# Patient Record
Sex: FEMALE | Race: WHITE | NOT HISPANIC OR LATINO | URBAN - METROPOLITAN AREA
[De-identification: names, ages, dates, MRNs, and addresses within clinical notes are randomized per-mention and may not be internally consistent; named-entity substitution may affect disease eponyms.]

---

## 2023-04-17 ENCOUNTER — OFFICE VISIT (OUTPATIENT)
Dept: PRIMARY CARE | Facility: CLINIC | Age: 33
End: 2023-04-17
Payer: COMMERCIAL

## 2023-04-17 VITALS
BODY MASS INDEX: 33.01 KG/M2 | TEMPERATURE: 97.9 F | SYSTOLIC BLOOD PRESSURE: 104 MMHG | DIASTOLIC BLOOD PRESSURE: 74 MMHG | HEIGHT: 62 IN | HEART RATE: 96 BPM | WEIGHT: 179.4 LBS

## 2023-04-17 DIAGNOSIS — Z00.00 HEALTH CARE MAINTENANCE: Primary | ICD-10-CM

## 2023-04-17 PROBLEM — B97.7 HPV IN FEMALE: Status: RESOLVED | Noted: 2021-06-08 | Resolved: 2023-04-17

## 2023-04-17 PROBLEM — Z98.891 S/P CESAREAN SECTION: Status: ACTIVE | Noted: 2021-12-28

## 2023-04-17 PROCEDURE — 99385 PREV VISIT NEW AGE 18-39: CPT | Performed by: NURSE PRACTITIONER

## 2023-04-17 PROCEDURE — 1036F TOBACCO NON-USER: CPT | Performed by: NURSE PRACTITIONER

## 2023-04-17 ASSESSMENT — ENCOUNTER SYMPTOMS
POLYPHAGIA: 0
BLOOD IN STOOL: 0
EYE PAIN: 0
VOMITING: 0
EYE DISCHARGE: 0
FREQUENCY: 0
ADENOPATHY: 0
COUGH: 0
MYALGIAS: 0
SHORTNESS OF BREATH: 0
WEAKNESS: 0
FEVER: 0
NUMBNESS: 0
DIFFICULTY URINATING: 0
PALPITATIONS: 0
SINUS PRESSURE: 0
UNEXPECTED WEIGHT CHANGE: 0
HEADACHES: 0
NECK PAIN: 0
ARTHRALGIAS: 0
HEMATURIA: 0
DYSURIA: 0
POLYDIPSIA: 0
EYE ITCHING: 0
FATIGUE: 0
SPEECH DIFFICULTY: 0
SORE THROAT: 0
DIARRHEA: 0
NAUSEA: 0
CONSTIPATION: 0
RHINORRHEA: 0
SLEEP DISTURBANCE: 0
DYSPHORIC MOOD: 0
BRUISES/BLEEDS EASILY: 0
PHOTOPHOBIA: 0
ABDOMINAL PAIN: 0
BACK PAIN: 0
DIZZINESS: 0
CHEST TIGHTNESS: 0
NERVOUS/ANXIOUS: 0
EYE REDNESS: 0
WHEEZING: 0
CHILLS: 0

## 2023-04-17 NOTE — PATIENT INSTRUCTIONS
Focus on a low sodium/low fat diet (whole grains, fresh fruits and vegetables, lean meats). Avoid foods high in sugar.  Increase exercise as tolerated.

## 2023-04-17 NOTE — PROGRESS NOTES
"Francisco Antonio is a 33 y.o. female who presents for Annual Exam.    HPI   Last well exam: 1 year ago  Health has been good in general.   PMH, PSH, FH and social history reviewed today.  Diet: \"pretty bad\" Boyfriend recently relapsed with leukemia  Exercise: No scheduled exercise.  Dental: Regular dental exams. Brushes1-2 times a day. Flosses 2-3 times a day.  Vision: Last eye exam: 7/2022   Corrected with glasses  Tobacco Use: No tobacco  Alcohol Use: rare alcohol use  Sexually active: Currently sexually active  PAP: UTD  Birth control: No birth control  Immunizations: UTD  Colonoscopy: No family history of colon cancer  Mammogram: No family history of breast cancer      Needs a referral to GYN.  Needs a referral to dermatology.         Review of Systems   Constitutional:  Negative for chills, fatigue, fever and unexpected weight change.   HENT:  Negative for congestion, ear pain, hearing loss, nosebleeds, postnasal drip, rhinorrhea, sinus pressure, sore throat and tinnitus.    Eyes:  Negative for photophobia, pain, discharge, redness, itching and visual disturbance.   Respiratory:  Negative for cough, chest tightness, shortness of breath and wheezing.    Cardiovascular:  Negative for chest pain, palpitations and leg swelling.   Gastrointestinal:  Negative for abdominal pain, blood in stool, constipation, diarrhea, nausea and vomiting.   Endocrine: Negative for cold intolerance, heat intolerance, polydipsia, polyphagia and polyuria.   Genitourinary:  Negative for difficulty urinating, dysuria, frequency, hematuria and urgency.   Musculoskeletal:  Negative for arthralgias, back pain, myalgias and neck pain.   Skin:  Negative for rash.   Neurological:  Negative for dizziness, syncope, speech difficulty, weakness, numbness and headaches.   Hematological:  Negative for adenopathy. Does not bruise/bleed easily.   Psychiatric/Behavioral:  Negative for dysphoric mood and sleep disturbance. The patient is not " "nervous/anxious.        Objective   /74 (BP Location: Left arm, Patient Position: Sitting)   Pulse 96   Temp 36.6 °C (97.9 °F) (Temporal)   Ht 1.575 m (5' 2\")   Wt 81.4 kg (179 lb 6.4 oz)   BMI 32.81 kg/m²     Physical Exam  Constitutional:       General: She is not in acute distress.     Appearance: Normal appearance. She is not toxic-appearing.   HENT:      Head: Normocephalic and atraumatic.      Right Ear: Tympanic membrane and ear canal normal.      Left Ear: Tympanic membrane and ear canal normal.      Nose: Nose normal.      Mouth/Throat:      Mouth: Mucous membranes are moist.      Pharynx: Oropharynx is clear.   Eyes:      Extraocular Movements: Extraocular movements intact.      Conjunctiva/sclera: Conjunctivae normal.      Pupils: Pupils are equal, round, and reactive to light.   Neck:      Thyroid: No thyroid mass or thyromegaly.   Cardiovascular:      Rate and Rhythm: Normal rate and regular rhythm.      Pulses: Normal pulses.      Heart sounds: Normal heart sounds, S1 normal and S2 normal. No murmur heard.  Pulmonary:      Effort: Pulmonary effort is normal. No respiratory distress.      Breath sounds: Normal breath sounds.   Abdominal:      General: Abdomen is flat. Bowel sounds are normal.      Palpations: Abdomen is soft.      Tenderness: There is no abdominal tenderness.   Musculoskeletal:         General: Normal range of motion.      Cervical back: Normal range of motion and neck supple.      Right lower leg: No edema.      Left lower leg: No edema.   Lymphadenopathy:      Cervical: No cervical adenopathy.   Skin:     General: Skin is warm and dry.   Neurological:      Mental Status: She is alert and oriented to person, place, and time.      Cranial Nerves: No cranial nerve deficit.      Sensory: No sensory deficit.      Motor: No weakness.      Coordination: Coordination normal.      Gait: Gait normal.      Deep Tendon Reflexes: Reflexes are normal and symmetric. Reflexes normal. "   Psychiatric:         Attention and Perception: Attention normal.         Mood and Affect: Mood and affect normal.         Speech: Speech normal.         Behavior: Behavior normal.         Thought Content: Thought content normal.         Judgment: Judgment normal.         Assessment/Plan   Problem List Items Addressed This Visit          Other    Health care maintenance - Primary     Discussed focusing on diet and exercise.  Fasting labs ordered today.         Relevant Orders    Comprehensive Metabolic Panel    CBC    Lipid Panel    Referral to Gynecology    Referral to Dermatology

## 2023-05-03 ENCOUNTER — LAB (OUTPATIENT)
Dept: LAB | Facility: LAB | Age: 33
End: 2023-05-03
Payer: COMMERCIAL

## 2023-05-03 DIAGNOSIS — Z00.00 HEALTH CARE MAINTENANCE: ICD-10-CM

## 2023-05-03 LAB
ALANINE AMINOTRANSFERASE (SGPT) (U/L) IN SER/PLAS: 33 U/L (ref 7–45)
ALBUMIN (G/DL) IN SER/PLAS: 4.3 G/DL (ref 3.4–5)
ALKALINE PHOSPHATASE (U/L) IN SER/PLAS: 110 U/L (ref 33–110)
ANION GAP IN SER/PLAS: 9 MMOL/L (ref 10–20)
ASPARTATE AMINOTRANSFERASE (SGOT) (U/L) IN SER/PLAS: 22 U/L (ref 9–39)
BILIRUBIN TOTAL (MG/DL) IN SER/PLAS: 1.1 MG/DL (ref 0–1.2)
CALCIUM (MG/DL) IN SER/PLAS: 9.5 MG/DL (ref 8.6–10.6)
CARBON DIOXIDE, TOTAL (MMOL/L) IN SER/PLAS: 28 MMOL/L (ref 21–32)
CHLORIDE (MMOL/L) IN SER/PLAS: 106 MMOL/L (ref 98–107)
CHOLESTEROL (MG/DL) IN SER/PLAS: 162 MG/DL (ref 0–199)
CHOLESTEROL IN HDL (MG/DL) IN SER/PLAS: 39 MG/DL
CHOLESTEROL/HDL RATIO: 4.2
CREATININE (MG/DL) IN SER/PLAS: 0.77 MG/DL (ref 0.5–1.05)
ERYTHROCYTE DISTRIBUTION WIDTH (RATIO) BY AUTOMATED COUNT: 13 % (ref 11.5–14.5)
ERYTHROCYTE MEAN CORPUSCULAR HEMOGLOBIN CONCENTRATION (G/DL) BY AUTOMATED: 32.2 G/DL (ref 32–36)
ERYTHROCYTE MEAN CORPUSCULAR VOLUME (FL) BY AUTOMATED COUNT: 91 FL (ref 80–100)
ERYTHROCYTES (10*6/UL) IN BLOOD BY AUTOMATED COUNT: 4.7 X10E12/L (ref 4–5.2)
GFR FEMALE: >90 ML/MIN/1.73M2
GLUCOSE (MG/DL) IN SER/PLAS: 83 MG/DL (ref 74–99)
HEMATOCRIT (%) IN BLOOD BY AUTOMATED COUNT: 42.8 % (ref 36–46)
HEMOGLOBIN (G/DL) IN BLOOD: 13.8 G/DL (ref 12–16)
LDL: 77 MG/DL (ref 0–99)
LEUKOCYTES (10*3/UL) IN BLOOD BY AUTOMATED COUNT: 5.3 X10E9/L (ref 4.4–11.3)
NON HDL CHOLESTEROL: 123 MG/DL
NRBC (PER 100 WBCS) BY AUTOMATED COUNT: 0 /100 WBC (ref 0–0)
PLATELETS (10*3/UL) IN BLOOD AUTOMATED COUNT: 229 X10E9/L (ref 150–450)
POTASSIUM (MMOL/L) IN SER/PLAS: 4.2 MMOL/L (ref 3.5–5.3)
PROTEIN TOTAL: 7.2 G/DL (ref 6.4–8.2)
SODIUM (MMOL/L) IN SER/PLAS: 139 MMOL/L (ref 136–145)
TRIGLYCERIDE (MG/DL) IN SER/PLAS: 231 MG/DL (ref 0–149)
UREA NITROGEN (MG/DL) IN SER/PLAS: 14 MG/DL (ref 6–23)
VLDL: 46 MG/DL (ref 0–40)

## 2023-05-03 PROCEDURE — 36415 COLL VENOUS BLD VENIPUNCTURE: CPT

## 2023-05-03 PROCEDURE — 85027 COMPLETE CBC AUTOMATED: CPT

## 2023-05-03 PROCEDURE — 80053 COMPREHEN METABOLIC PANEL: CPT

## 2023-05-03 PROCEDURE — 80061 LIPID PANEL: CPT

## 2023-05-09 ENCOUNTER — TELEPHONE (OUTPATIENT)
Dept: PRIMARY CARE | Facility: CLINIC | Age: 33
End: 2023-05-09
Payer: COMMERCIAL

## 2023-05-09 NOTE — TELEPHONE ENCOUNTER
----- Message from SLIME Guido sent at 5/8/2023 12:58 PM EDT -----  Regarding: FW: Pain in left breast  Contact: [phone]  Please set her up for a visit.  ----- Message -----  From: Savannah Hassan MA  Sent: 5/8/2023   7:18 AM EDT  To: SLIME Guido  Subject: FW: Pain in left breast                            ----- Message -----  From: Bertha Antonio  Sent: 5/7/2023   8:47 PM EDT  To:  [place] [id number] Clinical Support Staff  Subject: Pain in left breast                              Hello, for the past two weeks I have had on/off pain in my left breast. Nothing feels or looks out of the ordinary. I have noticed it happens if I lean on it or if my 1 year old pushes on it or something even slightly it sends like a burning sensation through my nipple. Then in the last day or so I’ve noticed some intermittent throbbing near my left armpit. I’m sure it could be a million little things but I’m wondering if i should get it checked? Thanks!

## 2023-05-09 NOTE — TELEPHONE ENCOUNTER
Per HIPAA left msg to call the office and schedule an appointment. Also sent a msg through [facility].

## 2023-05-15 ENCOUNTER — OFFICE VISIT (OUTPATIENT)
Dept: PRIMARY CARE | Facility: CLINIC | Age: 33
End: 2023-05-15
Payer: COMMERCIAL

## 2023-05-15 VITALS
TEMPERATURE: 97.7 F | WEIGHT: 178.4 LBS | DIASTOLIC BLOOD PRESSURE: 73 MMHG | BODY MASS INDEX: 32.63 KG/M2 | SYSTOLIC BLOOD PRESSURE: 114 MMHG | HEART RATE: 94 BPM

## 2023-05-15 DIAGNOSIS — R53.83 OTHER FATIGUE: ICD-10-CM

## 2023-05-15 DIAGNOSIS — N64.4 BREAST PAIN, LEFT: Primary | ICD-10-CM

## 2023-05-15 PROBLEM — R87.619 ABNORMAL PAP SMEAR OF CERVIX: Status: RESOLVED | Noted: 2019-06-11 | Resolved: 2023-05-15

## 2023-05-15 PROCEDURE — 99213 OFFICE O/P EST LOW 20 MIN: CPT | Performed by: NURSE PRACTITIONER

## 2023-05-15 PROCEDURE — 1036F TOBACCO NON-USER: CPT | Performed by: NURSE PRACTITIONER

## 2023-05-15 ASSESSMENT — ENCOUNTER SYMPTOMS
FEVER: 0
FATIGUE: 1
ABDOMINAL PAIN: 0
DIARRHEA: 0
BREAST PAIN: 1
MYALGIAS: 1
VOMITING: 0
NAUSEA: 0
CHILLS: 1
SORE THROAT: 1

## 2023-05-15 NOTE — PROGRESS NOTES
Subjective   Bertha Antonio is a 33 y.o. female who presents for Breast Pain (Left breast to armpit. ).    Breast Pain  Associated Symptoms: breast pain       She presents to the office with about 3 weeks of symptoms.  Felt tired and generally sick. Stayed home for a day. Had a sore throat at times.  (+) generalized body aches and fatigue. About 30 minutes after taking Ibuprofen broke out in a sweat but never had a fever that she is aware of.  Dean had CMV virus recently. 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   She has had normal period 5/1/23.    Then 2-2.5 weeks ago noticed with pressure she would get a burning sensation in the left breast towards her nipple. No nipple discharge.  Never occurring at rest -only with pressure.  Last week felt a throbbing pain up into the left lymph nodes/axilla region.  She has not been able to feel a lump.  No known injury.  Not currently breast feeding- last was October.  No improvement after having her period.    Lab Results   Component Value Date    GLUCOSE 83 05/03/2023    CALCIUM 9.5 05/03/2023     05/03/2023    K 4.2 05/03/2023    CO2 28 05/03/2023     05/03/2023    BUN 14 05/03/2023    CREATININE 0.77 05/03/2023      Lab Results   Component Value Date    WBC 5.3 05/03/2023    HGB 13.8 05/03/2023    HCT 42.8 05/03/2023    MCV 91 05/03/2023     05/03/2023        Review of Systems   Constitutional:  Positive for chills and fatigue. Negative for fever.   HENT:  Positive for sore throat.    Gastrointestinal:  Negative for abdominal pain, diarrhea, nausea and vomiting.   Musculoskeletal:  Positive for myalgias.     Objective   /73   Pulse 94   Temp 36.5 °C (97.7 °F) (Temporal)   Wt 80.9 kg (178 lb 6.4 oz)   BMI 32.63 kg/m²     Physical Exam  Constitutional:       General: She is not in acute distress.     Appearance: Normal appearance. She is not toxic-appearing.   Eyes:      Extraocular Movements: Extraocular movements intact.       Conjunctiva/sclera: Conjunctivae normal.      Pupils: Pupils are equal, round, and reactive to light.   Cardiovascular:      Rate and Rhythm: Normal rate and regular rhythm.      Pulses: Normal pulses.      Heart sounds: Normal heart sounds, S1 normal and S2 normal. No murmur heard.  Pulmonary:      Effort: Pulmonary effort is normal. No respiratory distress.      Breath sounds: Normal breath sounds.   Chest:   Breasts:     Right: Normal.      Left: Normal.   Abdominal:      General: Bowel sounds are normal.      Palpations: Abdomen is soft.      Tenderness: There is no abdominal tenderness.   Musculoskeletal:      Right lower leg: No edema.      Left lower leg: No edema.   Lymphadenopathy:      Cervical: No cervical adenopathy.      Upper Body:      Right upper body: No supraclavicular or axillary adenopathy.      Left upper body: No supraclavicular or axillary adenopathy.   Neurological:      Mental Status: She is alert and oriented to person, place, and time.   Psychiatric:         Attention and Perception: Attention normal.         Mood and Affect: Mood and affect normal.         Behavior: Behavior normal. Behavior is cooperative.         Thought Content: Thought content normal.         Cognition and Memory: Cognition normal.         Judgment: Judgment normal.         Assessment/Plan   Problem List Items Addressed This Visit          Other    Breast pain, left - Primary     Check diagnostic mammogram and US.         Relevant Orders    BI mammo bilateral diagnostic    BI US breast complete left    Other fatigue     If persistent, will further evaluate.   Reviewed labs done 2 weeks ago.

## 2024-02-20 ENCOUNTER — LAB (OUTPATIENT)
Dept: LAB | Facility: LAB | Age: 34
End: 2024-02-20
Payer: COMMERCIAL

## 2024-02-20 DIAGNOSIS — R53.83 OTHER FATIGUE: ICD-10-CM

## 2024-02-20 PROCEDURE — 36415 COLL VENOUS BLD VENIPUNCTURE: CPT

## 2024-02-20 PROCEDURE — 84443 ASSAY THYROID STIM HORMONE: CPT

## 2024-02-21 LAB — TSH SERPL-ACNC: 1.43 MIU/L (ref 0.44–3.98)

## 2024-03-19 PROBLEM — R07.9 CHEST PAIN: Status: ACTIVE | Noted: 2024-03-19

## 2024-03-19 PROBLEM — Z20.822 CONTACT WITH AND (SUSPECTED) EXPOSURE TO COVID-19: Status: ACTIVE | Noted: 2023-08-24

## 2024-03-19 PROBLEM — F32.A DEPRESSION: Status: ACTIVE | Noted: 2024-03-19

## 2024-03-22 ENCOUNTER — OFFICE VISIT (OUTPATIENT)
Dept: PRIMARY CARE | Facility: CLINIC | Age: 34
End: 2024-03-22
Payer: COMMERCIAL

## 2024-03-22 VITALS
BODY MASS INDEX: 32.65 KG/M2 | WEIGHT: 178.5 LBS | OXYGEN SATURATION: 99 % | DIASTOLIC BLOOD PRESSURE: 70 MMHG | SYSTOLIC BLOOD PRESSURE: 111 MMHG | HEART RATE: 78 BPM | TEMPERATURE: 97.4 F

## 2024-03-22 DIAGNOSIS — M53.3 PAIN IN THE COCCYX: Primary | ICD-10-CM

## 2024-03-22 DIAGNOSIS — F41.9 ANXIETY: ICD-10-CM

## 2024-03-22 PROCEDURE — 99213 OFFICE O/P EST LOW 20 MIN: CPT | Performed by: NURSE PRACTITIONER

## 2024-03-22 PROCEDURE — 1036F TOBACCO NON-USER: CPT | Performed by: NURSE PRACTITIONER

## 2024-03-22 ASSESSMENT — ENCOUNTER SYMPTOMS
FEVER: 0
SLEEP DISTURBANCE: 0
NERVOUS/ANXIOUS: 1
LOSS OF SENSATION IN FEET: 0
BACK PAIN: 1
CHILLS: 0
FATIGUE: 0
DYSPHORIC MOOD: 0
WEAKNESS: 0
NUMBNESS: 1
DEPRESSION: 0
OCCASIONAL FEELINGS OF UNSTEADINESS: 0

## 2024-03-22 ASSESSMENT — PATIENT HEALTH QUESTIONNAIRE - PHQ9
2. FEELING DOWN, DEPRESSED OR HOPELESS: NOT AT ALL
SUM OF ALL RESPONSES TO PHQ9 QUESTIONS 1 & 2: 0
1. LITTLE INTEREST OR PLEASURE IN DOING THINGS: NOT AT ALL

## 2024-03-22 NOTE — PROGRESS NOTES
"Subjective   Bertha Antonio is a 34 y.o. female who presents for Tailbone Pain (Been on and off for about 2 years but more noticeable since having a more sedentary job.).    HPI  She presents to the office today for evaluation of tailbone pain which has been present for about 2.5 years.  Noticed tailbone pain during pregnancy- a sharp zing through tailbone. Usually occurs after sitting for a while.  Was intermittent for a while.  Uncomfortable initially but better when moving a bit.  Now has more pain since sitting more for work.  No radiation of pain down the legs.  (+) numbness/tingling when sitting in position for a while but goes away quickly if repositions.    No low back pain, hip pain or abdominal pain.  (+) occasional pain in left hip since teenage years.  Had a .  No significant urinary issues.  No issues or pain with bowel movement.  No prior injury.    She also reports she has been struggling with anxiety.  No feeling sad, down, helpless or hopeless.  Motivation is good  No SI or HI.  (+) Excessive worry about own health  (+) Worry about worst case scenarios.  (+) feelings of impending doom- worry  No panic attacks.  Sleeping well at night- sleeps too much  Diet: \"ok\" could improve  Exercise: None  Caffeine use:1 cup of coffee a day  Alcohol use: None  Drug use: None    Review of Systems   Constitutional:  Negative for chills, fatigue and fever.   Musculoskeletal:  Positive for back pain.   Neurological:  Positive for numbness. Negative for weakness.   Psychiatric/Behavioral:  Negative for dysphoric mood, self-injury, sleep disturbance and suicidal ideas. The patient is nervous/anxious.      Objective   /70   Pulse 78   Temp 36.3 °C (97.4 °F)   Wt 81 kg (178 lb 8 oz)   SpO2 99%   BMI 32.65 kg/m²     Physical Exam  Constitutional:       General: She is not in acute distress.     Appearance: Normal appearance. She is not toxic-appearing.   Cardiovascular:      Rate and Rhythm: Normal " rate and regular rhythm.      Pulses: Normal pulses.      Heart sounds: Normal heart sounds, S1 normal and S2 normal. No murmur heard.  Pulmonary:      Effort: Pulmonary effort is normal.      Breath sounds: Normal breath sounds and air entry.   Musculoskeletal:      Lumbar back: Normal. No tenderness or bony tenderness. Normal range of motion. Negative right straight leg raise test and negative left straight leg raise test.      Right lower leg: No edema.      Left lower leg: No edema.   Lymphadenopathy:      Cervical: No cervical adenopathy.   Neurological:      Mental Status: She is alert and oriented to person, place, and time.      Sensory: Sensation is intact.      Motor: Motor function is intact.   Psychiatric:         Mood and Affect: Mood normal.         Behavior: Behavior normal.         Thought Content: Thought content normal.         Judgment: Judgment normal.         Assessment/Plan   Problem List Items Addressed This Visit    None  Visit Diagnoses       Pain in the coccyx    -  Primary    Relevant Orders    XR sacrum coccyx 2+ views    Referral to Physical Therapy    Anxiety        Relevant Orders    Referral to Psychology            It has been a pleasure seeing you today!

## 2024-04-22 ENCOUNTER — TELEMEDICINE (OUTPATIENT)
Dept: BEHAVIORAL HEALTH | Facility: CLINIC | Age: 34
End: 2024-04-22
Payer: COMMERCIAL

## 2024-04-22 DIAGNOSIS — F41.9 ANXIETY: ICD-10-CM

## 2024-04-22 PROCEDURE — 90791 PSYCH DIAGNOSTIC EVALUATION: CPT | Performed by: PSYCHOLOGIST

## 2024-04-22 NOTE — PROGRESS NOTES
Physical Therapy    Physical Therapy Lumbar Spine Evaluation    Patient Name: Bertha Antonio  MRN: [id number]  Today's Date: 2024  Time Calculation  Start Time: 1045  Stop Time: 1129  Time Calculation (min): 44 min  PT Evaluation Time Entry  PT Evaluation (Low) Time Entry: 34  PT Therapeutic Procedures Time Entry  Therapeutic Exercise Time Entry: 10                 Payor:  EMPLOYEE MEDICAL PLAN / Plan:  EMPLOYEE MEDICAL PLAN [employer] / Product Type: *No Product type* /     Reason for Referral: Coccyx pain  General Comment: Visit 1 of 30    Current Problem  Problem List Items Addressed This Visit             ICD-10-CM    Pain in the coccyx - Primary M53.3    Relevant Orders    Follow Up In Physical Therapy        Precautions  Precautions  Precautions Comment: None       Pain  Pain Assessment: 0-10  Pain Score: 2  Pain at worst: 10/10 for a very short period of time       SUBJECTIVE:   Pain location: tailbone pain   Intermittent   Some weeks it bother her and some weeks it does not  Positional   Denies radic     Onset: on/off for 2 years  Noticed late during pregnancy (pt had a )   Reports some L posterior ankle pain late in pregnancy-had emg-unremarkable. Has not had these symptoms in 2 years  Denies injury     -N/T  -B/B  Sometimes pain with Cough    Reviewed medical hx form     Imaging:  XR performed today. No results just yet     Aggravating factors:  Sitting, rising     Alleviating factors:  Once standing her symptoms will go away   No problems with walking     Prior level of function:  Previously independent with all functional activity    Functional limitations:  Sitting     Home setup:  Reviewed and no concern    Work:  RN-Full time    Patient stated goal:  Decrease pain    Prior tx:  None     Objective:    Lower Extremity ROM: (WNL unless documented below) (p=pain)    Lower Extremity Flexibility: (WNL unless documented below) (p=pain)  Flexibility  RIGHT LEFT   Quad 4 in  4 in     Hamstring      Hip flexor      Piriformis      ITB      Gastroc      Soleus        Lower Extremity Strength: (WNL unless documented below) (p=pain)   MMT 5/5 max  RIGHT LEFT   Hip Flexion 5 5   Hip Extension 4+ 4+   Hip Abduction 5 5   Hip Adduction 4+ 4+   Hip ER 4+ 4+   Hip IR  4+ 4+               Lumbar ROM:   Flexion WNL, NE    Extension Min loss, NE     RIGHT LEFT   Side Bend WNL, NE  WNL, NE      Torrie Lumbar repeated movements:   Pretest Symptoms: 0/10   RFIS Noted lumbar strain but not typical pain    GONSALO NE          Special tests: (WNL unless documented below)    RIGHT LEFT   SI compression - -   SI distraction  - -   Niurka - -     Myotomes: (WNL unless documented below)     Dermatomes: (WNL unless documented below)       Joint mobility: Pain with sacral mobs  Palpation: TTP over bilateral SIJ     Outcome Measure:  Other Measures  Oswestry Disablity Index (ETIENNE): 12%      TREATMENT:  Initial evaluation completed. Issued and reviewed HEP with pt that included:  Access Code: [email or web address]  URL: [email or web address]  Date: 04/23/2024  Prepared by: Michelle Cervantes    Exercises  - Prone Press Up  - 7 x weekly - 10 reps - every 2-3 hours frequency   - Standing Lumbar Extension  - 7 x weekly - 10 reps - every 2-3 hours frequency   - Seated Correct Posture  - 7 x weekly  - Bridge with Hip Abduction and Resistance - Ground Touches  - 1 x daily - 7 x weekly - 3 sets - 10 reps  - Clam with Resistance  - 1 x daily - 7 x weekly - 3 sets - 10 reps  - Sidelying Reverse Clamshell with Resistance  - 1 x daily - 7 x weekly - 3 sets - 10 reps  - Prone Alternating Arm and Leg Lifts  - 1 x daily - 7 x weekly - 3 sets - 10 reps    ASSESSEMENT  The pt presents with signs and symptoms consistent with the Physical Therapy diagnosis of LBP in sacral and coccyx region.   Pt demonstrates good ROM with some mild weakness in BLE. TTP over bilateral SI joints L>R. She also has pain with PA mobs to sacral region  however other SI provocation tests are negative at this time. Pain not produced with lumbar extension. Discussed importance of postural awareness. Pt will benefit from skilled physical therapy to reduce impairments in order to return to prior level of function, reduce pain, increase strength and ROM and improve overall posture.     The physical therapy prognosis is good for the patient to achieve their goals.   The pt tolerated therapy treatment today well with no adverse effects.    Personal factors/barriers to learning that impact therapy include:  None  Clinical presentation: Stable and/or uncomplicated characteristics  Level of clinical decision making is Low    PLAN  The pt will be seen 1 time(s) a week for 5-6 weeks.      The pt has been educated about the risks and benefits of physical therapy including manual therapy treatments and gives consent for treatment.     The patient will benefit from physical therapy treatment to include: therapeutic exercises, therapeutic activities, neurological re-education, manual therapy, modalities, and a home exercise program.       Goals:  Active       PT Problem       Reduce pain at worst to 3/10 with all functional and recreational activity.        Start:  04/23/24    Expected End:  07/22/24            Increase ROM/flexibility to WFL to perform daily functional activities         Start:  04/23/24    Expected End:  07/22/24            Increase by > or = 1/2 mm grade to improve postural awareness and body mechanics to perform daily tasks without increased pain/compensation          Start:  04/23/24    Expected End:  07/22/24            Pt to have decrease in Oswestry by 10% to display increased overall function.        Start:  04/23/24    Expected End:  07/22/24            Patient will demonstrate independence in home program for support of progression       Start:  04/23/24    Expected End:  07/22/24
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
## 2024-04-23 ENCOUNTER — HOSPITAL ENCOUNTER (OUTPATIENT)
Dept: RADIOLOGY | Facility: CLINIC | Age: 34
Discharge: HOME | End: 2024-04-23
Payer: COMMERCIAL

## 2024-04-23 ENCOUNTER — EVALUATION (OUTPATIENT)
Dept: PHYSICAL THERAPY | Facility: CLINIC | Age: 34
End: 2024-04-23
Payer: COMMERCIAL

## 2024-04-23 DIAGNOSIS — M53.3 PAIN IN THE COCCYX: ICD-10-CM

## 2024-04-23 DIAGNOSIS — M53.3 PAIN IN THE COCCYX: Primary | ICD-10-CM

## 2024-04-23 PROCEDURE — 97161 PT EVAL LOW COMPLEX 20 MIN: CPT | Mod: GP | Performed by: PHYSICAL THERAPIST

## 2024-04-23 PROCEDURE — 72220 X-RAY EXAM SACRUM TAILBONE: CPT

## 2024-04-23 PROCEDURE — 72220 X-RAY EXAM SACRUM TAILBONE: CPT | Performed by: RADIOLOGY

## 2024-04-23 PROCEDURE — 97110 THERAPEUTIC EXERCISES: CPT | Mod: GP | Performed by: PHYSICAL THERAPIST

## 2024-04-23 ASSESSMENT — PATIENT HEALTH QUESTIONNAIRE - PHQ9
1. LITTLE INTEREST OR PLEASURE IN DOING THINGS: NOT AT ALL
SUM OF ALL RESPONSES TO PHQ9 QUESTIONS 1 AND 2: 0
2. FEELING DOWN, DEPRESSED OR HOPELESS: NOT AT ALL

## 2024-04-23 ASSESSMENT — ENCOUNTER SYMPTOMS
OCCASIONAL FEELINGS OF UNSTEADINESS: 0
LOSS OF SENSATION IN FEET: 0
DEPRESSION: 0

## 2024-04-23 ASSESSMENT — PAIN SCALES - GENERAL: PAINLEVEL_OUTOF10: 2

## 2024-04-23 ASSESSMENT — PAIN - FUNCTIONAL ASSESSMENT: PAIN_FUNCTIONAL_ASSESSMENT: 0-10

## 2024-04-30 ENCOUNTER — TELEMEDICINE (OUTPATIENT)
Dept: BEHAVIORAL HEALTH | Facility: CLINIC | Age: 34
End: 2024-04-30
Payer: COMMERCIAL

## 2024-04-30 DIAGNOSIS — F41.9 ANXIETY: ICD-10-CM

## 2024-04-30 PROCEDURE — 90837 PSYTX W PT 60 MINUTES: CPT | Performed by: PSYCHOLOGIST
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
## 2024-05-09 ENCOUNTER — TREATMENT (OUTPATIENT)
Dept: PHYSICAL THERAPY | Facility: CLINIC | Age: 34
End: 2024-05-09
Payer: COMMERCIAL

## 2024-05-09 DIAGNOSIS — M53.3 PAIN IN THE COCCYX: Primary | ICD-10-CM

## 2024-05-09 PROCEDURE — 97110 THERAPEUTIC EXERCISES: CPT | Mod: GP,CQ

## 2024-05-09 PROCEDURE — 97140 MANUAL THERAPY 1/> REGIONS: CPT | Mod: GP,CQ

## 2024-05-09 ASSESSMENT — PAIN - FUNCTIONAL ASSESSMENT: PAIN_FUNCTIONAL_ASSESSMENT: 0-10

## 2024-05-09 ASSESSMENT — PAIN SCALES - GENERAL: PAINLEVEL_OUTOF10: 0 - NO PAIN

## 2024-05-09 NOTE — PROGRESS NOTES
Physical Therapy Treatment    Patient Name: Bertha Antonio  MRN: [id number]  Today's Date: 5/9/2024  Visit #2                          Payor:  EMPLOYEE MEDICAL PLAN / Plan:  EMPLOYEE MEDICAL PLAN CONSUMER [employer] / Product Type: *No Product type* /   Ana Robbins  Reason for Referral: Coccyx pain  General Comment: Visit 2 of 30      Current Problem:  Problem List Items Addressed This Visit             ICD-10-CM    Pain in the coccyx - Primary M53.3       Subjective   General:  Pt reports coccyx pain persists without much change. She notices tailbone pain vs low back pain.  HEP seems to help.     Pain:  Pain Assessment: 0-10  Pain Score: 0 - No pain      Precautions:  Precautions  Precautions Comment: None      Objective   No objective measures taken this visit  L PSIS TTP and elevated compared to R  Tenderness along coccyx     Treatment:  Therapeutic exercise  TM warm up x5 min 2.1 MPH   BL HSS x1 min, standing   BL piriformis stretch supine x1 min   Pelvic floor contraction x10 , supine   Supine adductor squeeze 5 sec hold 2x10 with pelvic floor contraction added to HEP   Bridge with pelvic floor contraction x10   Quadruped OL 2 sec hold x10 , DLS   Quadruped OA/OL x10 ea, DLS     Hip IR RTB 2x10, seated   Hip ER RTB 2x10 seated     Neuromuscular Re-education       Manual   PA mobs to L/S, gentle STM to L>R Piriformis and glute  Informed consent given on manual treatment. Pt given opportunity to cease treatment at any time. Educated pt on expected soreness. Pt voiced understanding  No adverse effects were noted post tx.      Modalities  Declined     Assessment   Pain in coccyx with pelvic floor contraction while in supine but did improve with less pain as reps went on.   Tenderness along coccyx, PSIS and piriformis with manual tx.   Pt overall tolerated tx well without complaints.    Plan    Continue to progress POC as tolerated by patient to improve strength, mobility and overall function      HEP:  Access  Code: [email or web address]  [facility]: [email or web address]  Date: 05/09/2024  Prepared by: Michelle Tomas    Exercises  - Prone Press Up  - 7 x weekly - 10 reps - every 2-3 hours frequency   - Standing Lumbar Extension  - 7 x weekly - 10 reps - every 2-3 hours frequency   - Seated Correct Posture  - 7 x weekly  - Bridge with Hip Abduction and Resistance - Ground Touches  - 1 x daily - 7 x weekly - 3 sets - 10 reps  - Clam with Resistance  - 1 x daily - 7 x weekly - 3 sets - 10 reps  - Sidelying Reverse Clamshell with Resistance  - 1 x daily - 7 x weekly - 3 sets - 10 reps  - Prone Alternating Arm and Leg Lifts  - 1 x daily - 7 x weekly - 3 sets - 10 reps  - Supine Hip Adduction Isometric with Ball  - 1 x daily - 7 x weekly - 2 sets - 10 reps - 5 sec hold  Goals:  Active       PT Problem       Reduce pain at worst to 3/10 with all functional and recreational activity.        Start:  04/23/24    Expected End:  07/22/24            Increase ROM/flexibility to WFL to perform daily functional activities         Start:  04/23/24    Expected End:  07/22/24            Increase by > or = 1/2 mm grade to improve postural awareness and body mechanics to perform daily tasks without increased pain/compensation          Start:  04/23/24    Expected End:  07/22/24            Pt to have decrease in Oswestry by 10% to display increased overall function.        Start:  04/23/24    Expected End:  07/22/24            Patient will demonstrate independence in home program for support of progression       Start:  04/23/24    Expected End:  07/22/24

## 2024-05-16 ENCOUNTER — TREATMENT (OUTPATIENT)
Dept: PHYSICAL THERAPY | Facility: CLINIC | Age: 34
End: 2024-05-16
Payer: COMMERCIAL

## 2024-05-16 DIAGNOSIS — M53.3 PAIN IN THE COCCYX: ICD-10-CM

## 2024-05-16 PROCEDURE — 97110 THERAPEUTIC EXERCISES: CPT | Mod: GP | Performed by: PHYSICAL THERAPIST

## 2024-05-16 PROCEDURE — 97140 MANUAL THERAPY 1/> REGIONS: CPT | Mod: GP | Performed by: PHYSICAL THERAPIST

## 2024-05-16 ASSESSMENT — PAIN SCALES - GENERAL: PAINLEVEL_OUTOF10: 4

## 2024-05-16 NOTE — PROGRESS NOTES
Physical Therapy Treatment    Patient Name: Bertha Antonio  MRN: [id number]  Today's Date: 5/16/2024  Visit #3  Time Calculation  Start Time: 1315  Stop Time: 1357  Time Calculation (min): 42 min     PT Therapeutic Procedures Time Entry  Manual Therapy Time Entry: 10  Therapeutic Exercise Time Entry: 32                 Payor:  EMPLOYEE MEDICAL PLAN / Plan:  EMPLOYEE MEDICAL PLAN CONSUMER [employer] / Product Type: *No Product type* /   Ana Robbins  Reason for Referral: Coccyx pain  General Comment: Visit 3 of 30      Current Problem:  Problem List Items Addressed This Visit             ICD-10-CM    Pain in the coccyx M53.3         Subjective   General:  Pt reports she experienced some initial back soreness after last session but then notes improved pain overall. However, today she has some soreness and is not sure if it is from playing with kids.   HEP seems to help.     Pain:  Pain Score: 4      Precautions:  Precautions  Precautions Comment: None      Objective   No objective measures taken this visit  L PSIS TTP and elevated compared to R  Tenderness along coccyx     Treatment:  Therapeutic exercise  TM warm up x5 min 2.1 MPH   BL HSS x1 min, standing   BL piriformis stretch supine x1 min   Inch worm/monster walks RTB 40 ft x 2   Pelvic floor contraction x10 , supine     Clamshell progression #4 2x10  Supine piriformis stretch after clamshells due to cramping 30 sec x 2   Quadruped OA/OL x10 ea, DLS   REIL x 10     Hip IR RTB 2x10, seated, with yellow ball   Hip ER RTB 2x10 seated       Manual   PA mobs to L/S, gentle STM to L>R Piriformis and glute  Informed consent given on manual treatment. Pt given opportunity to cease treatment at any time. Educated pt on expected soreness. Pt voiced understanding  No adverse effects were noted post tx.      Modalities  Declined     Assessment   Pt tolerated exercise progressions well. Noted some mm fatigue with clamshell progression. Pt did have some TTP over PSIS  region and in gluteal region bilaterally L>R. Noted feeling good post session. Discussed she may experience more post session soreness due to exercise progression.     Plan    Continue to progress POC as tolerated by patient to improve strength, mobility and overall function      HEP:  Access Code: [email or web address]  URL: [email or web address]  Date: 05/09/2024  Prepared by: Michelle Tomas    Exercises  - Prone Press Up  - 7 x weekly - 10 reps - every 2-3 hours frequency   - Standing Lumbar Extension  - 7 x weekly - 10 reps - every 2-3 hours frequency   - Seated Correct Posture  - 7 x weekly  - Bridge with Hip Abduction and Resistance - Ground Touches  - 1 x daily - 7 x weekly - 3 sets - 10 reps  - Clam with Resistance  - 1 x daily - 7 x weekly - 3 sets - 10 reps  - Sidelying Reverse Clamshell with Resistance  - 1 x daily - 7 x weekly - 3 sets - 10 reps  - Prone Alternating Arm and Leg Lifts  - 1 x daily - 7 x weekly - 3 sets - 10 reps  - Supine Hip Adduction Isometric with Ball  - 1 x daily - 7 x weekly - 2 sets - 10 reps - 5 sec hold  Goals:  Active       PT Problem       Reduce pain at worst to 3/10 with all functional and recreational activity.        Start:  04/23/24    Expected End:  07/22/24            Increase ROM/flexibility to WFL to perform daily functional activities         Start:  04/23/24    Expected End:  07/22/24            Increase by > or = 1/2 mm grade to improve postural awareness and body mechanics to perform daily tasks without increased pain/compensation          Start:  04/23/24    Expected End:  07/22/24            Pt to have decrease in Oswestry by 10% to display increased overall function.        Start:  04/23/24    Expected End:  07/22/24            Patient will demonstrate independence in home program for support of progression       Start:  04/23/24    Expected End:  07/22/24

## 2024-05-24 ENCOUNTER — OFFICE VISIT (OUTPATIENT)
Dept: PRIMARY CARE | Facility: CLINIC | Age: 34
End: 2024-05-24
Payer: COMMERCIAL

## 2024-05-24 VITALS
WEIGHT: 179 LBS | TEMPERATURE: 98.2 F | DIASTOLIC BLOOD PRESSURE: 84 MMHG | BODY MASS INDEX: 32.74 KG/M2 | HEART RATE: 94 BPM | OXYGEN SATURATION: 99 % | SYSTOLIC BLOOD PRESSURE: 130 MMHG

## 2024-05-24 DIAGNOSIS — H69.93 DYSFUNCTION OF BOTH EUSTACHIAN TUBES: Primary | ICD-10-CM

## 2024-05-24 ASSESSMENT — PATIENT HEALTH QUESTIONNAIRE - PHQ9
2. FEELING DOWN, DEPRESSED OR HOPELESS: NOT AT ALL
1. LITTLE INTEREST OR PLEASURE IN DOING THINGS: NOT AT ALL
SUM OF ALL RESPONSES TO PHQ9 QUESTIONS 1 AND 2: 0

## 2024-05-24 ASSESSMENT — ENCOUNTER SYMPTOMS
LOSS OF SENSATION IN FEET: 0
OCCASIONAL FEELINGS OF UNSTEADINESS: 0
DEPRESSION: 0

## 2024-05-24 NOTE — PROGRESS NOTES
Subjective   Patient ID: Bertha Antonio is a 34 y.o. female who presents for Cough, Nasal Congestion, and Earache (On and off since december).    Patient presenting due to concerns for recurrent infections.  She feels like there is a crackling and popping sensation in her ear persistently.  She does have  a toddler that gets sick intermittently her child does not go to ; however they do go around other people.     Objective   /84 (BP Location: Left arm, Patient Position: Sitting, BP Cuff Size: Adult)   Pulse 94   Temp 36.8 °C (98.2 °F) (Skin)   Wt 81.2 kg (179 lb)   SpO2 99%   BMI 32.74 kg/m²     Physical Exam  Constitutional:       Appearance: Normal appearance. She is normal weight.   HENT:      Head: Normocephalic and atraumatic.      Ears:      Comments: Serous effusion bilaterally w/ reracted TM      Nose: Nose normal.      Mouth/Throat:      Mouth: Mucous membranes are moist.      Pharynx: Oropharynx is clear.   Eyes:      Extraocular Movements: Extraocular movements intact.      Conjunctiva/sclera: Conjunctivae normal.      Pupils: Pupils are equal, round, and reactive to light.   Pulmonary:      Effort: Pulmonary effort is normal.   Neurological:      General: No focal deficit present.      Mental Status: She is alert.   Psychiatric:         Mood and Affect: Mood normal.         Assessment/Plan   Diagnoses and all orders for this visit:  Dysfunction of both eustachian tubes    Discussed Claritin and Flonase use to help prevent fluid stasis through sinuses.  Discussed common sinus infections/viral infections with young children    Blanche Justice DO     I spent a total of 13 minutes on the date of the service which included preparing to see the patient, face-to-face patient care, completing clinical documentation, performing a medically appropriate examination, counseling and educating the patient/family/caregiver and ordering medications, tests, or procedures.

## 2024-05-28 ENCOUNTER — TREATMENT (OUTPATIENT)
Dept: PHYSICAL THERAPY | Facility: CLINIC | Age: 34
End: 2024-05-28
Payer: COMMERCIAL

## 2024-05-28 DIAGNOSIS — M53.3 PAIN IN THE COCCYX: ICD-10-CM

## 2024-05-28 PROCEDURE — 97140 MANUAL THERAPY 1/> REGIONS: CPT | Mod: GP | Performed by: PHYSICAL THERAPIST

## 2024-05-28 PROCEDURE — 97110 THERAPEUTIC EXERCISES: CPT | Mod: GP | Performed by: PHYSICAL THERAPIST

## 2024-05-28 ASSESSMENT — PAIN SCALES - GENERAL: PAINLEVEL_OUTOF10: 6

## 2024-05-28 NOTE — PROGRESS NOTES
"Physical Therapy Treatment    Patient Name: Bertha Antonio  MRN: [id number]  Today's Date: 5/28/2024  Visit #4  Time Calculation  Start Time: 1535  Stop Time: 1627  Time Calculation (min): 52 min     PT Therapeutic Procedures Time Entry  Manual Therapy Time Entry: 11  Therapeutic Exercise Time Entry: 32  PT Modalities Time Entry  Hot/Cold Pack Time Entry: 10              Payor:  EMPLOYEE MEDICAL PLAN / Plan:  EMPLOYEE MEDICAL PLAN CONSUMER [employer] / Product Type: *No Product type* /   Ana Robbins  Reason for Referral: Coccyx pain  General Comment: Visit 4 of 30      Current Problem:  Problem List Items Addressed This Visit             ICD-10-CM    Pain in the coccyx M53.3           Subjective   General:  Pt her symptoms are \"a little bit better\" overall. Notes less pain in tailbone but more pain in LB.  Pain at worst this week 6/10  HEP compliance: yes     Pain:  Pain Score: 6      Precautions:  Precautions  Precautions Comment: None      Objective   No objective measures taken this visit  L PSIS TTP and elevated compared to R  Tenderness along coccyx     Treatment:  Therapeutic exercise  TM warm up x5 min 2.5 MPH   BL HSS x1 min, standing   BL piriformis stretch supine x1 min (performed seated)   Anti rotation with side step GTB x 10 ea   Bridges RSB under knees 2x10  LTR RSB x 10 ea   Inch worm/monster walks RTB 40 ft x 2-HEP    Clamshell progression #4 2x10  Quadruped OA/OL x10 ea, DLS-held   REIL x 10     SB exercises as tolerated next visit      Manual   PA mobs to L/S, gentle STM to L>R Piriformis and glute  Informed consent given on manual treatment. Pt given opportunity to cease treatment at any time. Educated pt on expected soreness. Pt voiced understanding  No adverse effects were noted post tx.      Modalities  MHP x 10 min to low back in prone    Assessment   Pt did well with exercise progressions today. Quite a bit of tenderness along R lumbar paraspinals. Tolerated MHP well     Plan    Continue " to progress POC as tolerated by patient to improve strength, mobility and overall function      HEP:  Access Code: [email or web address]  URL: [email or web address]  Date: 05/28/2024  Prepared by: Michelle Cervantes    Exercises  - Prone Press Up  - 7 x weekly - 10 reps - every 2-3 hours frequency   - Standing Lumbar Extension  - 7 x weekly - 10 reps - every 2-3 hours frequency   - Seated Correct Posture  - 7 x weekly  - Bridge with Hip Abduction and Resistance - Ground Touches  - 1 x daily - 3-4 x weekly - 3 sets - 10 reps  - Clam with Resistance  - 1 x daily - 3-4 x weekly - 3 sets - 10 reps  - Sidelying Reverse Clamshell with Resistance  - 1 x daily - 3-4 x weekly - 3 sets - 10 reps  - Prone Alternating Arm and Leg Lifts  - 1 x daily - 3-4 x weekly - 3 sets - 10 reps  - Supine Hip Adduction Isometric with Ball  - 1 x daily - 3-4 x weekly - 2 sets - 10 reps - 5 sec hold  - Forward Monster Walks  - 1 x daily - 3-4 x weekly - 3 sets - 10 reps  - Backward Monster Walks  - 1 x daily - 3-4 x weekly - 3 sets - 10 reps  - Side Stepping with Resistance at Ankles  - 1 x daily - 3-4 x weekly - 3 sets - 10 reps    Goals:  Active       PT Problem       Reduce pain at worst to 3/10 with all functional and recreational activity.        Start:  04/23/24    Expected End:  07/22/24            Increase ROM/flexibility to WFL to perform daily functional activities         Start:  04/23/24    Expected End:  07/22/24            Increase by > or = 1/2 mm grade to improve postural awareness and body mechanics to perform daily tasks without increased pain/compensation          Start:  04/23/24    Expected End:  07/22/24            Pt to have decrease in Oswestry by 10% to display increased overall function.        Start:  04/23/24    Expected End:  07/22/24            Patient will demonstrate independence in home program for support of progression       Start:  04/23/24    Expected End:  07/22/24

## 2024-06-04 ENCOUNTER — APPOINTMENT (OUTPATIENT)
Dept: PHYSICAL THERAPY | Facility: CLINIC | Age: 34
End: 2024-06-04
Payer: COMMERCIAL

## 2024-06-04 NOTE — PROGRESS NOTES
"Physical Therapy Treatment    Patient Name: Bertha Antonio  MRN: [id number]  Today's Date: 6/4/2024  Visit #5                          Payor:  EMPLOYEE MEDICAL PLAN / Plan:  EMPLOYEE MEDICAL PLAN CONSUMER [employer] / Product Type: *No Product type* /   Ana Robbins         Current Problem:  Problem List Items Addressed This Visit    None            Subjective   General:  Pt her symptoms are \"a little bit better\" overall. Notes less pain in tailbone but more pain in LB.  Pain at worst this week 6/10  HEP compliance: yes     Pain:         Precautions:         Objective   No objective measures taken this visit  L PSIS TTP and elevated compared to R  Tenderness along coccyx     Treatment:  Therapeutic exercise  TM warm up x5 min 2.5 MPH   BL HSS x1 min, standing   BL piriformis stretch supine x1 min (performed seated)   Anti rotation with side step GTB x 10 ea   Bridges RSB under knees 2x10  LTR RSB x 10 ea   Inch worm/monster walks RTB 40 ft x 2-HEP    Clamshell progression #4 2x10  Quadruped OA/OL x10 ea, DLS-held   REIL x 10     SB exercises as tolerated next visit      Manual   PA mobs to L/S, gentle STM to L>R Piriformis and glute  Informed consent given on manual treatment. Pt given opportunity to cease treatment at any time. Educated pt on expected soreness. Pt voiced understanding  No adverse effects were noted post tx.      Modalities  MHP x 10 min to low back in prone    Assessment   Pt did well with exercise progressions today. Quite a bit of tenderness along R lumbar paraspinals. Tolerated MHP well     Plan    Continue to progress POC as tolerated by patient to improve strength, mobility and overall function      HEP:  Access Code: [email or web address]  URL: [email or web address]  Date: 05/28/2024  Prepared by: Michelle Cervantes    Exercises  - Prone Press Up  - 7 x weekly - 10 reps - every 2-3 hours frequency   - Standing Lumbar Extension  - 7 x weekly - 10 reps - every 2-3 hours frequency   - " Seated Correct Posture  - 7 x weekly  - Bridge with Hip Abduction and Resistance - Ground Touches  - 1 x daily - 3-4 x weekly - 3 sets - 10 reps  - Clam with Resistance  - 1 x daily - 3-4 x weekly - 3 sets - 10 reps  - Sidelying Reverse Clamshell with Resistance  - 1 x daily - 3-4 x weekly - 3 sets - 10 reps  - Prone Alternating Arm and Leg Lifts  - 1 x daily - 3-4 x weekly - 3 sets - 10 reps  - Supine Hip Adduction Isometric with Ball  - 1 x daily - 3-4 x weekly - 2 sets - 10 reps - 5 sec hold  - Forward Monster Walks  - 1 x daily - 3-4 x weekly - 3 sets - 10 reps  - Backward Monster Walks  - 1 x daily - 3-4 x weekly - 3 sets - 10 reps  - Side Stepping with Resistance at Ankles  - 1 x daily - 3-4 x weekly - 3 sets - 10 reps    Goals:  Active       PT Problem       Reduce pain at worst to 3/10 with all functional and recreational activity.        Start:  04/23/24    Expected End:  07/22/24            Increase ROM/flexibility to WFL to perform daily functional activities         Start:  04/23/24    Expected End:  07/22/24            Increase by > or = 1/2 mm grade to improve postural awareness and body mechanics to perform daily tasks without increased pain/compensation          Start:  04/23/24    Expected End:  07/22/24            Pt to have decrease in Oswestry by 10% to display increased overall function.        Start:  04/23/24    Expected End:  07/22/24            Patient will demonstrate independence in home program for support of progression       Start:  04/23/24    Expected End:  07/22/24

## 2024-06-12 ENCOUNTER — APPOINTMENT (OUTPATIENT)
Dept: PRIMARY CARE | Facility: CLINIC | Age: 34
End: 2024-06-12
Payer: COMMERCIAL

## 2024-06-12 VITALS
DIASTOLIC BLOOD PRESSURE: 72 MMHG | TEMPERATURE: 98.3 F | HEART RATE: 95 BPM | WEIGHT: 180.4 LBS | SYSTOLIC BLOOD PRESSURE: 110 MMHG | BODY MASS INDEX: 33.2 KG/M2 | HEIGHT: 62 IN | OXYGEN SATURATION: 98 %

## 2024-06-12 DIAGNOSIS — J06.9 RECURRENT URI (UPPER RESPIRATORY INFECTION): ICD-10-CM

## 2024-06-12 DIAGNOSIS — R53.83 FATIGUE, UNSPECIFIED TYPE: Primary | ICD-10-CM

## 2024-06-12 PROCEDURE — 99213 OFFICE O/P EST LOW 20 MIN: CPT | Performed by: NURSE PRACTITIONER

## 2024-06-12 PROCEDURE — 1036F TOBACCO NON-USER: CPT | Performed by: NURSE PRACTITIONER

## 2024-06-12 RX ORDER — MULTIVITAMIN/IRON/FOLIC ACID 18MG-0.4MG
1 TABLET ORAL DAILY
COMMUNITY

## 2024-06-12 ASSESSMENT — ENCOUNTER SYMPTOMS
CHILLS: 0
WHEEZING: 0
VOICE CHANGE: 0
FATIGUE: 1
SHORTNESS OF BREATH: 0
COUGH: 1
SINUS PRESSURE: 1
DIARRHEA: 0
SINUS PAIN: 0
VOMITING: 0
RHINORRHEA: 0
NAUSEA: 0
ABDOMINAL PAIN: 0
FEVER: 1
MYALGIAS: 1
SORE THROAT: 1

## 2024-06-12 ASSESSMENT — PATIENT HEALTH QUESTIONNAIRE - PHQ9
SUM OF ALL RESPONSES TO PHQ9 QUESTIONS 1 AND 2: 0
2. FEELING DOWN, DEPRESSED OR HOPELESS: NOT AT ALL
1. LITTLE INTEREST OR PLEASURE IN DOING THINGS: NOT AT ALL

## 2024-06-12 NOTE — PATIENT INSTRUCTIONS
Continue to use Flonase.   Continue to track the frequency of viral infections.  Obtain the blood work as ordered today.

## 2024-06-12 NOTE — PROGRESS NOTES
"Subjective    Bertha Antonio is a 34 y.o. female who presents for URI (Started Sunday with ST, then developed a fever on Monday and now currently have somewhat of a cough and very congested. Pt took IBU and Dayquil and last taken yesterday.).    HPI  She presents to the office today for evaluation of symptoms which started on Sunday. Started  with a sore throat, then had body aches, low grade fever for a day. Now has nasal congestion/sinus pressure.  (+) fullness and popping of both ears.   No ear pain.  (+)PND  No significant cough.  No SOB or wheezing.  (+) fatigue  No abdominal pain, nausea, vomiting or diarrhea.    This is generally occurring about once a month. Reports recurrent URIs. They generally follow with the same course.  Symptoms resolve completely in between each occurrence other than ear fullness and popping.  She reports this has been occurring monthly since about January.  Even in between episodes feels more tired than usual.     Has a young daughter. Works in oncology.    Review of Systems   Constitutional:  Positive for fatigue and fever. Negative for chills.   HENT:  Positive for congestion, postnasal drip, sinus pressure and sore throat. Negative for ear pain, rhinorrhea, sinus pain and voice change.    Respiratory:  Positive for cough. Negative for shortness of breath and wheezing.    Gastrointestinal:  Negative for abdominal pain, diarrhea, nausea and vomiting.   Musculoskeletal:  Positive for myalgias.     Objective   /72 (BP Location: Left arm, Patient Position: Sitting)   Pulse 95   Temp 36.8 °C (98.3 °F) (Oral)   Ht 1.57 m (5' 1.81\")   Wt 81.8 kg (180 lb 6.4 oz)   SpO2 98%   BMI 33.20 kg/m²     Physical Exam  Constitutional:       General: She is not in acute distress.     Appearance: Normal appearance. She is not toxic-appearing.   HENT:      Right Ear: Tympanic membrane, ear canal and external ear normal.      Left Ear: Tympanic membrane, ear canal and external ear normal. "      Nose: Nose normal.      Mouth/Throat:      Mouth: Mucous membranes are moist.      Pharynx: Oropharynx is clear. No oropharyngeal exudate or posterior oropharyngeal erythema.   Cardiovascular:      Rate and Rhythm: Normal rate and regular rhythm.      Heart sounds: Normal heart sounds, S1 normal and S2 normal. No murmur heard.  Pulmonary:      Effort: Pulmonary effort is normal.      Breath sounds: Normal breath sounds and air entry. No wheezing.   Lymphadenopathy:      Cervical: No cervical adenopathy.   Neurological:      Mental Status: She is alert and oriented to person, place, and time.   Psychiatric:         Attention and Perception: Attention normal.         Mood and Affect: Mood and affect normal.         Behavior: Behavior is cooperative.         Thought Content: Thought content normal.         Judgment: Judgment normal.         Assessment/Plan   Problem List Items Addressed This Visit    None  Visit Diagnoses       Fatigue, unspecified type    -  Primary    Relevant Orders    Comprehensive Metabolic Panel    CBC and Auto Differential    TSH with reflex to Free T4 if abnormal    Recurrent URI (upper respiratory infection)            Symptomatic treatment. Advised to use Flonase daily to help with the postnasal drainage and eustachian tube dysfunction.  Will also check labs.     It has been a pleasure seeing you today!

## 2024-06-20 DIAGNOSIS — J01.90 ACUTE NON-RECURRENT SINUSITIS, UNSPECIFIED LOCATION: Primary | ICD-10-CM

## 2024-06-20 RX ORDER — AMOXICILLIN AND CLAVULANATE POTASSIUM 875; 125 MG/1; MG/1
875 TABLET, FILM COATED ORAL 2 TIMES DAILY
Qty: 14 TABLET | Refills: 0 | Status: SHIPPED | OUTPATIENT
Start: 2024-06-20

## 2024-07-18 PROCEDURE — 88305 TISSUE EXAM BY PATHOLOGIST: CPT | Performed by: DERMATOLOGY

## 2024-07-22 ENCOUNTER — LAB REQUISITION (OUTPATIENT)
Dept: DERMATOPATHOLOGY | Facility: CLINIC | Age: 34
End: 2024-07-22
Payer: COMMERCIAL

## 2024-07-22 DIAGNOSIS — D48.5 NEOPLASM OF UNCERTAIN BEHAVIOR OF SKIN: ICD-10-CM

## 2024-07-23 LAB
LABORATORY COMMENT REPORT: NORMAL
PATH REPORT.FINAL DX SPEC: NORMAL
PATH REPORT.GROSS SPEC: NORMAL
PATH REPORT.MICROSCOPIC SPEC OTHER STN: NORMAL
PATH REPORT.RELEVANT HX SPEC: NORMAL
PATH REPORT.TOTAL CANCER: NORMAL

## 2024-08-06 ENCOUNTER — APPOINTMENT (OUTPATIENT)
Dept: PRIMARY CARE | Facility: CLINIC | Age: 34
End: 2024-08-06
Payer: COMMERCIAL

## 2024-08-06 ENCOUNTER — LAB (OUTPATIENT)
Dept: LAB | Facility: LAB | Age: 34
End: 2024-08-06
Payer: COMMERCIAL

## 2024-08-06 VITALS
TEMPERATURE: 97.8 F | OXYGEN SATURATION: 96 % | DIASTOLIC BLOOD PRESSURE: 80 MMHG | WEIGHT: 178.6 LBS | SYSTOLIC BLOOD PRESSURE: 110 MMHG | BODY MASS INDEX: 32.87 KG/M2 | HEART RATE: 90 BPM

## 2024-08-06 DIAGNOSIS — R53.83 FATIGUE, UNSPECIFIED TYPE: ICD-10-CM

## 2024-08-06 DIAGNOSIS — H69.91 DYSFUNCTION OF RIGHT EUSTACHIAN TUBE: ICD-10-CM

## 2024-08-06 DIAGNOSIS — Z00.00 HEALTH CARE MAINTENANCE: ICD-10-CM

## 2024-08-06 DIAGNOSIS — Z00.00 HEALTH CARE MAINTENANCE: Primary | ICD-10-CM

## 2024-08-06 LAB
ALBUMIN SERPL BCP-MCNC: 4.4 G/DL (ref 3.4–5)
ALP SERPL-CCNC: 67 U/L (ref 33–110)
ALT SERPL W P-5'-P-CCNC: 17 U/L (ref 7–45)
ANION GAP SERPL CALC-SCNC: 9 MMOL/L (ref 10–20)
AST SERPL W P-5'-P-CCNC: 13 U/L (ref 9–39)
BASOPHILS # BLD AUTO: 0.03 X10*3/UL (ref 0–0.1)
BASOPHILS NFR BLD AUTO: 0.5 %
BILIRUB SERPL-MCNC: 1.2 MG/DL (ref 0–1.2)
BUN SERPL-MCNC: 11 MG/DL (ref 6–23)
CALCIUM SERPL-MCNC: 9.3 MG/DL (ref 8.6–10.3)
CHLORIDE SERPL-SCNC: 107 MMOL/L (ref 98–107)
CHOLEST SERPL-MCNC: 165 MG/DL (ref 0–199)
CHOLESTEROL/HDL RATIO: 3.2
CO2 SERPL-SCNC: 26 MMOL/L (ref 21–32)
CREAT SERPL-MCNC: 0.7 MG/DL (ref 0.5–1.05)
EGFRCR SERPLBLD CKD-EPI 2021: >90 ML/MIN/1.73M*2
EOSINOPHIL # BLD AUTO: 0.06 X10*3/UL (ref 0–0.7)
EOSINOPHIL NFR BLD AUTO: 1 %
ERYTHROCYTE [DISTWIDTH] IN BLOOD BY AUTOMATED COUNT: 12.5 % (ref 11.5–14.5)
GLUCOSE SERPL-MCNC: 76 MG/DL (ref 74–99)
HCT VFR BLD AUTO: 42.3 % (ref 36–46)
HDLC SERPL-MCNC: 51.4 MG/DL
HGB BLD-MCNC: 14.3 G/DL (ref 12–16)
IMM GRANULOCYTES # BLD AUTO: 0.01 X10*3/UL (ref 0–0.7)
IMM GRANULOCYTES NFR BLD AUTO: 0.2 % (ref 0–0.9)
LDLC SERPL CALC-MCNC: 97 MG/DL
LYMPHOCYTES # BLD AUTO: 2.44 X10*3/UL (ref 1.2–4.8)
LYMPHOCYTES NFR BLD AUTO: 40.3 %
MCH RBC QN AUTO: 30.7 PG (ref 26–34)
MCHC RBC AUTO-ENTMCNC: 33.8 G/DL (ref 32–36)
MCV RBC AUTO: 91 FL (ref 80–100)
MONOCYTES # BLD AUTO: 0.66 X10*3/UL (ref 0.1–1)
MONOCYTES NFR BLD AUTO: 10.9 %
NEUTROPHILS # BLD AUTO: 2.85 X10*3/UL (ref 1.2–7.7)
NEUTROPHILS NFR BLD AUTO: 47.1 %
NON HDL CHOLESTEROL: 114 MG/DL (ref 0–149)
NRBC BLD-RTO: 0 /100 WBCS (ref 0–0)
PLATELET # BLD AUTO: 182 X10*3/UL (ref 150–450)
POTASSIUM SERPL-SCNC: 4 MMOL/L (ref 3.5–5.3)
PROT SERPL-MCNC: 7.1 G/DL (ref 6.4–8.2)
RBC # BLD AUTO: 4.66 X10*6/UL (ref 4–5.2)
SODIUM SERPL-SCNC: 138 MMOL/L (ref 136–145)
TRIGL SERPL-MCNC: 81 MG/DL (ref 0–149)
TSH SERPL-ACNC: 1.69 MIU/L (ref 0.44–3.98)
VLDL: 16 MG/DL (ref 0–40)
WBC # BLD AUTO: 6.1 X10*3/UL (ref 4.4–11.3)

## 2024-08-06 PROCEDURE — 80061 LIPID PANEL: CPT

## 2024-08-06 PROCEDURE — 1036F TOBACCO NON-USER: CPT | Performed by: NURSE PRACTITIONER

## 2024-08-06 PROCEDURE — 36415 COLL VENOUS BLD VENIPUNCTURE: CPT

## 2024-08-06 PROCEDURE — 85025 COMPLETE CBC W/AUTO DIFF WBC: CPT

## 2024-08-06 PROCEDURE — 99395 PREV VISIT EST AGE 18-39: CPT | Performed by: NURSE PRACTITIONER

## 2024-08-06 PROCEDURE — 80053 COMPREHEN METABOLIC PANEL: CPT

## 2024-08-06 PROCEDURE — 84443 ASSAY THYROID STIM HORMONE: CPT

## 2024-08-06 ASSESSMENT — ENCOUNTER SYMPTOMS
DYSPHORIC MOOD: 0
FEVER: 0
MYALGIAS: 0
POLYDIPSIA: 0
DIFFICULTY URINATING: 0
BLOOD IN STOOL: 0
BRUISES/BLEEDS EASILY: 0
POLYPHAGIA: 0
VOMITING: 0
FATIGUE: 0
SPEECH DIFFICULTY: 0
DYSURIA: 0
ADENOPATHY: 0
HEADACHES: 0
CONSTIPATION: 0
EYE PAIN: 0
DIZZINESS: 0
ABDOMINAL PAIN: 0
NERVOUS/ANXIOUS: 1
RHINORRHEA: 0
HEMATURIA: 0
NAUSEA: 0
FREQUENCY: 0
NUMBNESS: 0
CHILLS: 0
PALPITATIONS: 0
PHOTOPHOBIA: 0
DIARRHEA: 0
BACK PAIN: 0
SORE THROAT: 0
NECK PAIN: 0
CHEST TIGHTNESS: 0
WEAKNESS: 0
COUGH: 0
SLEEP DISTURBANCE: 0
SHORTNESS OF BREATH: 0
EYE DISCHARGE: 0
WHEEZING: 0
SINUS PRESSURE: 0
ARTHRALGIAS: 0
UNEXPECTED WEIGHT CHANGE: 0
EYE ITCHING: 0
EYE REDNESS: 0

## 2024-08-06 NOTE — PROGRESS NOTES
Francisco Antonio is a 34 y.o. female who presents for Annual Exam (GYN: follows with Nikky Beltre /LMP: 24).    HPI  Last well exam: 2023  Health has been good in general other than ongoing crackling and popping in the right ear. This is intermittent. Overall has been feeling really well otherwise. Has been using Flonase and Zyrtec. With the crackling notices a tingling sensation in temple and up into the hairline.  There have been no changes in the patients PMH, PSH, FH or social history. Reviewed today.  Diet: Working on diet- generally does not eat enough fruits and vegetables  Exercise: no scheduled exercise.   Dental: Regular dental exams. Brushes 1-2 times a day. Flosses 3 times a day.  Vision: Last eye exam:  2024  Corrected with part time glasses (driving)  Tobacco Use: None  Alcohol Use: rare alcohol   Sexually active: Engaged and sexually active.  LMP: 3 weeks ago 2024  Periods are regular and occurring about every 27-30 days  Duration is 4 days.  No heavy bleeding or cramping.  (fullterm)  PAP: UTD on PAP- follows with GYN- Nancy Beltre  Birth control: None  Immunizations: UTD  Colonoscopy: No family history of colon cancer  Mammogram: No immediate family history of breast cancer.      Last labs: pending     Review of Systems   Constitutional:  Negative for chills, fatigue, fever and unexpected weight change.   HENT:  Negative for congestion, ear discharge, ear pain, hearing loss, nosebleeds, postnasal drip, rhinorrhea, sinus pressure, sore throat and tinnitus.    Eyes:  Negative for photophobia, pain, discharge, redness, itching and visual disturbance.   Respiratory:  Negative for cough, chest tightness, shortness of breath and wheezing.    Cardiovascular:  Negative for chest pain, palpitations and leg swelling.   Gastrointestinal:  Negative for abdominal pain, blood in stool, constipation, diarrhea, nausea and vomiting.   Endocrine: Negative for cold intolerance, heat  intolerance, polydipsia, polyphagia and polyuria.   Genitourinary:  Negative for difficulty urinating, dysuria, frequency, hematuria and urgency.   Musculoskeletal:  Negative for arthralgias, back pain, myalgias and neck pain.   Skin:  Negative for rash.   Neurological:  Negative for dizziness, syncope, speech difficulty, weakness, numbness and headaches.   Hematological:  Negative for adenopathy. Does not bruise/bleed easily.   Psychiatric/Behavioral:  Negative for dysphoric mood and sleep disturbance. The patient is nervous/anxious.        Objective   /80 (BP Location: Left arm, Patient Position: Sitting)   Pulse 90   Temp 36.6 °C (97.8 °F) (Temporal)   Wt 81 kg (178 lb 9.6 oz)   SpO2 96%   BMI 32.87 kg/m²     Physical Exam  Constitutional:       General: She is not in acute distress.     Appearance: Normal appearance. She is not toxic-appearing.   HENT:      Head: Normocephalic and atraumatic.      Right Ear: Tympanic membrane and ear canal normal.      Left Ear: Tympanic membrane and ear canal normal.      Nose: Nose normal.      Mouth/Throat:      Mouth: Mucous membranes are moist.      Pharynx: Oropharynx is clear.   Eyes:      Extraocular Movements: Extraocular movements intact.      Conjunctiva/sclera: Conjunctivae normal.      Pupils: Pupils are equal, round, and reactive to light.   Neck:      Thyroid: No thyroid mass or thyromegaly.      Vascular: No carotid bruit.   Cardiovascular:      Rate and Rhythm: Normal rate and regular rhythm.      Pulses: Normal pulses.      Heart sounds: Normal heart sounds, S1 normal and S2 normal. No murmur heard.  Pulmonary:      Effort: Pulmonary effort is normal. No respiratory distress.      Breath sounds: Normal breath sounds.   Abdominal:      General: Bowel sounds are normal.      Palpations: Abdomen is soft.      Tenderness: There is no abdominal tenderness.   Musculoskeletal:         General: Normal range of motion.      Cervical back: Normal range of  motion and neck supple.      Right lower leg: No edema.      Left lower leg: No edema.   Lymphadenopathy:      Cervical: No cervical adenopathy.   Skin:     General: Skin is warm and dry.   Neurological:      Mental Status: She is alert and oriented to person, place, and time.      Cranial Nerves: No cranial nerve deficit.      Sensory: No sensory deficit.      Motor: No weakness.      Coordination: Coordination normal.      Gait: Gait normal.      Deep Tendon Reflexes: Reflexes are normal and symmetric. Reflexes normal.   Psychiatric:         Attention and Perception: Attention normal.         Mood and Affect: Mood and affect normal.         Speech: Speech normal.         Behavior: Behavior normal.         Thought Content: Thought content normal.         Judgment: Judgment normal.         Assessment/Plan   Problem List Items Addressed This Visit       Health care maintenance - Primary    Relevant Orders    Lipid Panel     Other Visit Diagnoses       Dysfunction of right eustachian tube        Relevant Orders    Referral to ENT            It has been a pleasure seeing you today!

## 2024-08-20 ENCOUNTER — APPOINTMENT (OUTPATIENT)
Dept: OBSTETRICS AND GYNECOLOGY | Facility: CLINIC | Age: 34
End: 2024-08-20
Payer: COMMERCIAL

## 2024-09-13 ENCOUNTER — LAB (OUTPATIENT)
Dept: LAB | Facility: LAB | Age: 34
End: 2024-09-13
Payer: COMMERCIAL

## 2024-09-13 LAB — COTININE UR QL SCN: NEGATIVE

## 2024-10-15 ENCOUNTER — APPOINTMENT (OUTPATIENT)
Dept: OBSTETRICS AND GYNECOLOGY | Facility: CLINIC | Age: 34
End: 2024-10-15
Payer: COMMERCIAL

## 2024-10-21 ENCOUNTER — OFFICE VISIT (OUTPATIENT)
Dept: URGENT CARE | Age: 34
End: 2024-10-21
Payer: COMMERCIAL

## 2024-10-21 VITALS
SYSTOLIC BLOOD PRESSURE: 109 MMHG | HEART RATE: 94 BPM | DIASTOLIC BLOOD PRESSURE: 65 MMHG | RESPIRATION RATE: 16 BRPM | TEMPERATURE: 97.5 F | OXYGEN SATURATION: 97 %

## 2024-10-21 DIAGNOSIS — J40 BRONCHITIS: ICD-10-CM

## 2024-10-21 DIAGNOSIS — R05.1 ACUTE COUGH: Primary | ICD-10-CM

## 2024-10-21 DIAGNOSIS — R50.9 FEVER, UNSPECIFIED FEVER CAUSE: ICD-10-CM

## 2024-10-21 DIAGNOSIS — R06.2 WHEEZE: ICD-10-CM

## 2024-10-21 PROCEDURE — 1036F TOBACCO NON-USER: CPT | Performed by: NURSE PRACTITIONER

## 2024-10-21 PROCEDURE — 99203 OFFICE O/P NEW LOW 30 MIN: CPT | Performed by: NURSE PRACTITIONER

## 2024-10-21 RX ORDER — DOXYCYCLINE 100 MG/1
100 CAPSULE ORAL 2 TIMES DAILY
Qty: 20 CAPSULE | Refills: 0 | Status: SHIPPED | OUTPATIENT
Start: 2024-10-21 | End: 2024-10-31

## 2024-10-21 RX ORDER — PREDNISONE 20 MG/1
20 TABLET ORAL DAILY
Qty: 10 TABLET | Refills: 0 | Status: SHIPPED | OUTPATIENT
Start: 2024-10-21 | End: 2024-10-31

## 2024-10-21 RX ORDER — ALBUTEROL SULFATE 90 UG/1
2 INHALANT RESPIRATORY (INHALATION) EVERY 4 HOURS PRN
Qty: 8.5 G | Refills: 0 | Status: SHIPPED | OUTPATIENT
Start: 2024-10-21 | End: 2025-10-21

## 2024-10-21 ASSESSMENT — ENCOUNTER SYMPTOMS
PSYCHIATRIC NEGATIVE: 1
RHINORRHEA: 1
CHILLS: 1
SORE THROAT: 1
HEMATOLOGIC/LYMPHATIC NEGATIVE: 1
COUGH: 1
ENDOCRINE NEGATIVE: 1
HEADACHES: 1
MUSCULOSKELETAL NEGATIVE: 1
ALLERGIC/IMMUNOLOGIC NEGATIVE: 1
SWEATS: 1
FEVER: 1

## 2024-10-21 NOTE — PROGRESS NOTES
Subjective   Patient ID: Bertha Antonio is a 34 y.o. female. They present today with a chief complaint of Cough (Cough x 1 week, rib pain with coughing/Fever on Friday, resolved).    History of Present Illness    Cough  This is a new problem. The current episode started in the past 7 days. The problem has been gradually worsening. The problem occurs every few hours. The cough is Productive of brown sputum. Associated symptoms include chills, ear congestion, a fever, headaches, nasal congestion, rhinorrhea, a sore throat and sweats. She has tried OTC cough suppressant for the symptoms.       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 29 y.o.  Paragard for contraception Former smoker Normal paps before this 2019: ASCUS, HPV other + 2019: colpo adequate, acetowhite changes at 10-12, some hypervascularity. Biopsy 1 o'clock MATTEO I Plan: co-testing in 1 year    HPV in female 2021       Past Surgical History:   Procedure Laterality Date     SECTION, LOW TRANSVERSE          reports that she quit smoking about 8 years ago. Her smoking use included cigarettes. She started smoking about 18 years ago. She has a 5 pack-year smoking history. She has never been exposed to tobacco smoke. She has never used smokeless tobacco. She reports current alcohol use. She reports that she does not use drugs.    Review of Systems  Review of Systems   Constitutional:  Positive for chills and fever.   HENT:  Positive for nosebleeds, rhinorrhea and sore throat.    Respiratory:  Positive for cough.    Endocrine: Negative.    Genitourinary: Negative.    Musculoskeletal: Negative.    Skin: Negative.    Allergic/Immunologic: Negative.     Neurological:  Positive for headaches.   Hematological: Negative.    Psychiatric/Behavioral: Negative.                                    Objective    Vitals:    10/21/24 1217   BP: 109/65   Pulse: 94   Resp: 16   Temp: 36.4 °C (97.5 °F)   TempSrc: Temporal   SpO2: 97%     No LMP recorded.    Physical Exam  Vitals and nursing note reviewed.   Constitutional:       Appearance: Normal appearance. She is normal weight.   HENT:      Nose: Nasal tenderness, mucosal edema and rhinorrhea present.   Cardiovascular:      Rate and Rhythm: Normal rate and regular rhythm.   Pulmonary:      Breath sounds: Wheezing and rhonchi present.   Abdominal:      General: Bowel sounds are normal.      Palpations: Abdomen is soft.   Musculoskeletal:         General: Normal range of motion.      Cervical back: Normal range of motion.   Skin:     General: Skin is warm and dry.   Neurological:      General: No focal deficit present.      Mental Status: She is alert and oriented to person, place, and time. Mental status is at baseline.   Psychiatric:         Mood and Affect: Mood normal.         Behavior: Behavior normal.         Procedures    Point of Care Test & Imaging Results from this visit  No results found for this visit on 10/21/24.   No results found.    Diagnostic study results (if any) were reviewed by SLIME Umana.    Assessment/Plan   Allergies, medications, history, and pertinent labs/EKGs/Imaging reviewed by SLIME Umana.     Medical Decision Making  Medical Decision Making  At time of discharge patient was clinically well-appearing and HDS for outpatient management. The patient and/or family was educated regarding diagnosis, supportive care, OTC and Rx medications. The patient and/or family was given the opportunity to ask questions prior to discharge.  They verbalized understanding of my discussion of the plans for treatment, expected course, indications to return to  or seek further evaluation  in ED, and the need for timely follow up as directed.   They were provided with a work/school excuse if requested.        Orders and Diagnoses  Diagnoses and all orders for this visit:  Acute cough  -     doxycycline (Vibramycin) 100 mg capsule; Take 1 capsule (100 mg) by mouth 2 times a day for 10 days. Take with at least 8 ounces (large glass) of water, do not lie down for 30 minutes after  -     predniSONE (Deltasone) 20 mg tablet; Take 1 tablet (20 mg) by mouth once daily for 10 days.  -     albuterol (ProAir HFA) 90 mcg/actuation inhaler; Inhale 2 puffs every 4 hours if needed for wheezing or shortness of breath.  Fever, unspecified fever cause  -     doxycycline (Vibramycin) 100 mg capsule; Take 1 capsule (100 mg) by mouth 2 times a day for 10 days. Take with at least 8 ounces (large glass) of water, do not lie down for 30 minutes after  -     predniSONE (Deltasone) 20 mg tablet; Take 1 tablet (20 mg) by mouth once daily for 10 days.  -     albuterol (ProAir HFA) 90 mcg/actuation inhaler; Inhale 2 puffs every 4 hours if needed for wheezing or shortness of breath.  Bronchitis  -     doxycycline (Vibramycin) 100 mg capsule; Take 1 capsule (100 mg) by mouth 2 times a day for 10 days. Take with at least 8 ounces (large glass) of water, do not lie down for 30 minutes after  -     predniSONE (Deltasone) 20 mg tablet; Take 1 tablet (20 mg) by mouth once daily for 10 days.  -     albuterol (ProAir HFA) 90 mcg/actuation inhaler; Inhale 2 puffs every 4 hours if needed for wheezing or shortness of breath.  Wheeze  -     doxycycline (Vibramycin) 100 mg capsule; Take 1 capsule (100 mg) by mouth 2 times a day for 10 days. Take with at least 8 ounces (large glass) of water, do not lie down for 30 minutes after  -     predniSONE (Deltasone) 20 mg tablet; Take 1 tablet (20 mg) by mouth once daily for 10 days.  -     albuterol (ProAir HFA) 90 mcg/actuation inhaler; Inhale 2 puffs every 4 hours if needed for wheezing or  shortness of breath.      Medical Admin Record    Return to Urgent care if symptoms return or progress  Follow up with PCP in 1-2 weeks     Patient disposition: Home    Electronically signed by SLIME Umana  12:49 PM

## 2024-11-22 NOTE — PROGRESS NOTES
Subjective   Patient ID: Bertha Antonio is a 34 y.o. female who presents for No chief complaint on file..  HPI  This 34-year-old female is being seen in the office today for an evaluation into ear pain.  On 2024 she was seen by her primary care physician for an acute bronchitis with coughing was placed on Vibramycin and prednisone as treatment.  Had developed into a pneumonia that she still been having lingering coughing as of .  No chest x-ray has been done to date.  The patient stated her hearing has not been involved with her perceived ear pain.  There is been no drainage from her ears and there is no vertigo.  Review of Systems   A 12 point ROS  has been reviewed and are negative for complaint except for what is stated in the history of present illness and /or for past medical history as noted in the EMR.    Past Medical History:   Diagnosis Date    Abnormal Pap smear of cervix 2019    Formatting of this note might be different from the original. 29 y.o.  Paragard for contraception Former smoker Normal paps before this 2019: ASCUS, HPV other + 2019: colpo adequate, acetowhite changes at 10-12, some hypervascularity. Biopsy 1 o'clock MATTEO I Plan: co-testing in 1 year    HPV in female 2021          Current Outpatient Medications:     albuterol (ProAir HFA) 90 mcg/actuation inhaler, Inhale 2 puffs every 4 hours if needed for wheezing or shortness of breath., Disp: 8.5 g, Rfl: 0    b complex 0.4 mg tablet, Take 1 tablet by mouth once daily., Disp: , Rfl:     cetirizine HCl (ZYRTEC ORAL), Take by mouth once daily., Disp: , Rfl:     fluticasone propionate (FLONASE NASL), Administer into affected nostril(s)., Disp: , Rfl:      Past Surgical History:   Procedure Laterality Date     SECTION, LOW TRANSVERSE          Social History     Tobacco Use    Smoking status: Former     Current packs/day: 0.00     Average packs/day: 0.5 packs/day for 10.0 years (5.0 ttl pk-yrs)      Types: Cigarettes     Start date:      Quit date: 2016     Years since quittin.8     Passive exposure: Never    Smokeless tobacco: Never   Substance Use Topics    Alcohol use: Yes     Comment: Rare      No Known Allergies    There were no vitals taken for this visit.    Objective   Physical Exam  Examination:    CONSTITUTIONAL: Alert, in no acute distress, normal pitch/clarity of voice, well-developed, well-nourished, cooperative.  HEAD/FACE: Normocephalic, atraumatic, no tenderness over the sinuses, facial strength and movement symmetric.    SKIN: Good turgor, no rashes, no suspicious lesions, in the head and neck.    EYES: Both eyes have normal extraocular movements with no nystagmus, pupils are equal and reactive to light and accommodation, conjunctiva is clear.    EARS: Both ears are negative for external skin abnormalities, external auditory canals are without lesions or signs of inflammation, tympanic membranes are intact and are of normal color and texture, no effusions are seen, light reflexes normal, no mastoid tenderness is noted to palpation, objective hearing is intact.    NOSE: No external skin lesions are noted, nares are patent, septum is intact and noninflamed, nasal turbinates are normal in appearance, sinuses are nontender to palpation bilaterally, no internal lesions or polyps are noted, no discharge is noted.    OROPHARYNX/ORAL CAVITY: Mucous membranes of the oropharynx and the oral cavity proper are without lesions or ulcerations, tongue mobility is normal and no lesions are noted, gingiva and alveolar mucosa is intact without lesions, oral mucosa is moist, muscular movement of the palate and gag reflex are normal.    NASOPHARYNX: Mucous membranes are noninflamed and no secretions or lesions are noted.    LARYNX: No mucosal inflammation or exudates are noted, arytenoids are normal in appearance and mobility, false vocal cords are without lesions as is the remainder of the  supraglottic larynx, true vocal folds are mobile without inflammation or obstructions and no masses or lesions are noted in the endolarynx.    NECK: No lymphadenopathy is palpated, neck is supple with full range of motion, thyroid is without swelling or tenderness, trachea is midline, no neck masses are noted.    Lymphatics: No cervical adenopathy or supraclavicular adenopathy noted to palpation.    HEART/VASCULAR: No jugular venous distention is noted, carotid pulsations are intact with a regular rate and rhythm noted,    PULMONARY: Good air movement with normal inspiratory/expiratory effort is noted, no audible wheezing is appreciated.    NEUROLOGIC: Alert and oriented, cranial nerves are grossly intact, gait is normal, sensation in the head and neck is intact,    PSYCH: oriented to person, place and time, normal mood and affect.    EXTREMITIES: No motor dysfunction of the upper and lower extremity is noted.   Assessment/Plan   {Assess/PlanSmartLinks:06486}         Waqar Gaitan DMD, MD 11/22/24 10:19 AM

## 2024-11-26 ENCOUNTER — CLINICAL SUPPORT (OUTPATIENT)
Dept: AUDIOLOGY | Facility: CLINIC | Age: 34
End: 2024-11-26
Payer: COMMERCIAL

## 2024-11-26 ENCOUNTER — APPOINTMENT (OUTPATIENT)
Dept: OTOLARYNGOLOGY | Facility: CLINIC | Age: 34
End: 2024-11-26
Payer: COMMERCIAL

## 2024-11-26 DIAGNOSIS — H69.91 EUSTACHIAN TUBE DYSFUNCTION, RIGHT: ICD-10-CM

## 2024-11-26 DIAGNOSIS — H90.11 CONDUCTIVE HEARING LOSS OF RIGHT EAR WITH UNRESTRICTED HEARING OF LEFT EAR: Primary | ICD-10-CM

## 2024-11-26 DIAGNOSIS — H92.03 OTALGIA OF BOTH EARS: ICD-10-CM

## 2024-11-26 PROCEDURE — 92550 TYMPANOMETRY & REFLEX THRESH: CPT | Performed by: AUDIOLOGIST

## 2024-11-26 PROCEDURE — 92557 COMPREHENSIVE HEARING TEST: CPT | Performed by: AUDIOLOGIST

## 2024-11-26 NOTE — PROGRESS NOTES
COMPREHENSIVE AUDIOMETRIC EVALUATION      Name:  Bertha Antonio  :  1990  Age:  34 y.o.  Date of Evaluation:  24   Referring Provider:  David Alonso PA-C/SELF     History:  Ms. Antonio was seen today for an evaluation of hearing.  Patient reported sinus issues for approximately a year with a crackling sound and right aural fullness occurring weeks to moths after sinus involvement starts.  Patient reported left itching ear and possible otorrhea though denied aural fullness.  Patient noted intermittent otalgia greater in the right than the left ear located down the side of her neck to ear.  Patient noted that when her sinus involvement will flare up she will have tingling on the right temple/forehead.  When asked, patient denied tinnitus, dizziness, concerns for decreased hearing sensitivity, medical history significant for diabetes, and treatment by chemotherapy or radiation    See audiometric evaluation at end of this report or scanned under media tab    OTOSCOPY:       Right Ear: Clear canal       Left Ear: Clear canal    226 Hz TYMPANOMETRY:       Right Ear: Borderline Type A/C: Borderline negative pressure with normal compliance and ear canal volume, which may be consistent with eustachian tube dysfunction       Left Ear: Type A: normal peak pressure, compliance, and ear canal volume, consistent with middle ear function within normal limits    AUDIOMETRIC EVALUATION (Phones):       Right Ear: Mild rising to Normal, Conductive hearing loss                 Left Ear: Normal 125 - 8000 Hz      Test technique:  Standard Audiometry  Reliability:   good    SPEECH RECOGNITION THRESHOLD:       Right Ear:  5 dBHL in good agreement with PTA       Left Ear:  10 dBHL in good agreement with PTA    WORD RECOGNITION:       Right Ear:  90%  excellent (%) at normal presentation level       Left Ear:   100%  excellent (%) at normal presentation level      IPSILATERAL ACOUSTIC REFLEXES:       Right  Ear:  Present from 2000 Hz to 4000 Hz and absent at 500-1000 Hz; consistent with low frequency conductive hearing loss       Left Ear:    WNL from 500- 4000 Hz, consistent with patient's hearing sensitivity    DISCUSSION:   Discussed results and recommendations with patient.  Questions were addressed and the patient was encouraged to contact our department should concerns arise.    RECOMMENDATIONS:  -Recommend patient continue with scheduled ENT appointment.  -Recommend patient return following medical management for repeated audiometric evaluation.    Zachary Redd, CCC-A     Appt: 10:45 - 11:15 AM

## 2024-12-03 ENCOUNTER — OFFICE VISIT (OUTPATIENT)
Dept: OBSTETRICS AND GYNECOLOGY | Facility: CLINIC | Age: 34
End: 2024-12-03
Payer: COMMERCIAL

## 2024-12-03 VITALS
RESPIRATION RATE: 16 BRPM | BODY MASS INDEX: 32.76 KG/M2 | HEIGHT: 62 IN | SYSTOLIC BLOOD PRESSURE: 126 MMHG | WEIGHT: 178 LBS | DIASTOLIC BLOOD PRESSURE: 80 MMHG

## 2024-12-03 DIAGNOSIS — Z01.419 WELL WOMAN EXAM WITH ROUTINE GYNECOLOGICAL EXAM: ICD-10-CM

## 2024-12-03 DIAGNOSIS — Z87.42 HISTORY OF ABNORMAL CERVICAL PAP SMEAR: Primary | ICD-10-CM

## 2024-12-03 PROCEDURE — 1036F TOBACCO NON-USER: CPT | Performed by: NURSE PRACTITIONER

## 2024-12-03 PROCEDURE — 99385 PREV VISIT NEW AGE 18-39: CPT | Performed by: NURSE PRACTITIONER

## 2024-12-03 PROCEDURE — 87624 HPV HI-RISK TYP POOLED RSLT: CPT | Performed by: NURSE PRACTITIONER

## 2024-12-03 PROCEDURE — 3008F BODY MASS INDEX DOCD: CPT | Performed by: NURSE PRACTITIONER

## 2024-12-03 ASSESSMENT — SOCIAL DETERMINANTS OF HEALTH (SDOH)
WITHIN THE LAST YEAR, HAVE YOU BEEN KICKED, HIT, SLAPPED, OR OTHERWISE PHYSICALLY HURT BY YOUR PARTNER OR EX-PARTNER?: NO
WITHIN THE LAST YEAR, HAVE YOU BEEN AFRAID OF YOUR PARTNER OR EX-PARTNER?: NO
WITHIN THE LAST YEAR, HAVE YOU BEEN HUMILIATED OR EMOTIONALLY ABUSED IN OTHER WAYS BY YOUR PARTNER OR EX-PARTNER?: NO
WITHIN THE LAST YEAR, HAVE TO BEEN RAPED OR FORCED TO HAVE ANY KIND OF SEXUAL ACTIVITY BY YOUR PARTNER OR EX-PARTNER?: NO

## 2024-12-03 ASSESSMENT — ENCOUNTER SYMPTOMS
WHEEZING: 1
SINUS PRESSURE: 1
NERVOUS/ANXIOUS: 1
COUGH: 1
SHORTNESS OF BREATH: 1
FATIGUE: 1
BACK PAIN: 1

## 2024-12-03 ASSESSMENT — PAIN SCALES - GENERAL: PAINLEVEL_OUTOF10: 0-NO PAIN

## 2024-12-03 NOTE — PROGRESS NOTES
"Whitney Chamorro, APRN-CNP     Subjective   Bertha Antonio is a 34 y.o. female who presents for annual exam.   34-year-old  new to the practice here for an annual exam    History reviewed.  Patient has concerns about blurry vision that occurs at the start of her menstrual period has not seen a neurologist.  Provider has suggested that would be a place to go for evaluation of possible atypical migraines associated with the menstrual cycle.    Patient reports having regular menstrual cycles at this time.  Patient has 1 child delivered by  section after a growth ultrasound indicated IUGR and the baby was in a transverse lie.  Past Medical History:   Diagnosis Date    Abnormal Pap smear of cervix 2019    Formatting of this note might be different from the original. 29 y.o.  Paragard for contraception Former smoker Normal paps before this 2019: ASCUS, HPV other + 2019: colpo adequate, acetowhite changes at 10-12, some hypervascularity. Biopsy 1 o'clock MATTEO I Plan: co-testing in 1 year    HPV in female 2021     Past Surgical History:   Procedure Laterality Date     SECTION, LOW TRANSVERSE         OB History          3    Para   1    Term   1            AB   2    Living             SAB        IAB   2    Ectopic        Multiple        Live Births                   Patient's last menstrual period was 2024 (approximate).      Review of Systems   Constitutional:  Positive for fatigue.   HENT:  Positive for sinus pressure.    Eyes:  Positive for visual disturbance.   Respiratory:  Positive for cough, shortness of breath and wheezing.    Musculoskeletal:  Positive for back pain.   Psychiatric/Behavioral:  The patient is nervous/anxious.    All other systems reviewed and are negative.    Breast: Pain   Vaginal: No Complaints        Objective   /80 (BP Location: Right arm, Patient Position: Sitting, BP Cuff Size: Adult)   Resp 16   Ht 1.575 m (5' 2\")   Wt " 80.7 kg (178 lb)   LMP 11/12/2024 (Approximate)   BMI 32.56 kg/m²   Physical Exam  Constitutional:       Appearance: She is normal weight.   Genitourinary:      Vulva and urethral meatus normal.      Right Labia: No rash, tenderness or lesions.     Left Labia: No tenderness, lesions or rash.     No vaginal discharge.      No vaginal prolapse present.     No vaginal atrophy present.       Right Adnexa: not tender and no mass present.     Left Adnexa: not tender and no mass present.     No cervical motion tenderness.      Uterus is not enlarged.   Breasts:     Right: Normal.   HENT:      Head: Normocephalic.   Cardiovascular:      Rate and Rhythm: Normal rate.      Heart sounds: Normal heart sounds.   Pulmonary:      Effort: Pulmonary effort is normal.      Breath sounds: Normal breath sounds.   Abdominal:      Palpations: Abdomen is soft.   Musculoskeletal:         General: Normal range of motion.      Cervical back: Normal range of motion.   Neurological:      Mental Status: She is alert.   Skin:     General: Skin is warm and dry.   Psychiatric:         Mood and Affect: Mood normal.         Behavior: Behavior normal.   Vitals reviewed.                   Assessment/Plan   Problem List Items Addressed This Visit    None  Visit Diagnoses         Codes    History of abnormal cervical Pap smear    -  Primary Z87.42    Relevant Orders    THINPREP PAP TEST DIAGNOSTIC (>25)    Well woman exam with routine gynecological exam     Z01.419

## 2024-12-11 ENCOUNTER — APPOINTMENT (OUTPATIENT)
Dept: OTOLARYNGOLOGY | Facility: CLINIC | Age: 34
End: 2024-12-11
Payer: COMMERCIAL

## 2024-12-11 DIAGNOSIS — R09.81 NASAL CONGESTION: ICD-10-CM

## 2024-12-11 DIAGNOSIS — G50.1 ATYPICAL FACE PAIN: ICD-10-CM

## 2024-12-11 DIAGNOSIS — J32.9 CHRONIC SINUSITIS, UNSPECIFIED LOCATION: Primary | ICD-10-CM

## 2024-12-11 PROCEDURE — 99203 OFFICE O/P NEW LOW 30 MIN: CPT | Performed by: PHYSICIAN ASSISTANT

## 2024-12-11 NOTE — PROGRESS NOTES
Bertha Antonio is a 34 y.o. year old female patient with chronic recurrent sinusitis and upper respiratory illness.  The patient states that since January she has been experiencing chronic recurrent bouts of sinusitis and other respiratory illnesses.  She states that from January to June it was 1 time a month and she did well in July and August but since September the once a month frequency has returned with recurrent infection.  This is required treatment with multiple antibiotics.  She is utilize antihistamines as well as nasal steroids with minimal effectiveness.  In addition to recurrent illness the patient also complains of nasal congestion and some facial pain and pressure greatest over the right frontal and right premaxillary regions.  She is otherwise well and denies other ENT related concerns at this time.         Review of Systems   All other systems reviewed and are negative.        Physical Exam:   General appearance: No acute distress. Normal facies. Symmetric facial movement. No gross lesions of the face are noted.  The external ear structures appear normal. The ear canals patent and the tympanic membranes are intact without evidence of air-fluid levels, retraction, or congenital defects.  Anterior rhinoscopy notes essentially a midline nasal septum. Examination is noted for normal healthy mucosal membranes without any evidence of lesions, polyps, or exudate. The tongue is normally mobile. There are no lesions on the gingiva, buccal, or oral mucosa. There are no oral cavity masses.  The neck is negative for mass lymphadenopathy. The trachea and parotid are clear. The thyroid bed is grossly unremarkable. The salivary gland structures are grossly unremarkable.    Assessment/Plan   1.  Recurrent sinusitis  2.  Nasal congestion  3.  Atypical facial pain    Patient seen in the office today for assessment of recurrent sinusitis with greater than 6 bouts of sinusitis and other respiratory illnesses in the  last 12 months.  The patient at this time has utilize nasal steroids daily with minimal relief of symptoms.  In addition to recurrent infection and treatment with multiple antibiotics she is also experiencing ongoing nasal congestion with atypical facial pain and pressure.  The patient at this time will be set up for dedicated immune blood work as well as CT scan of the sinuses.  We will see the patient back following CT and labs to review and discuss further plan.  In addition to nasal steroid spray the patient will also utilize nasal saline irrigation as instructed in the office today.    All questions and concerns were answered and addressed. The patient expresses understanding and will follow up as advised.  Thank you again for allowing us to participate in the care of this patient.

## 2024-12-18 LAB
CYTOLOGY CMNT CVX/VAG CYTO-IMP: NORMAL
HPV HR 12 DNA GENITAL QL NAA+PROBE: NEGATIVE
HPV HR GENOTYPES PNL CVX NAA+PROBE: NEGATIVE
HPV16 DNA SPEC QL NAA+PROBE: NEGATIVE
HPV18 DNA SPEC QL NAA+PROBE: NEGATIVE
LAB AP HPV GENOTYPE QUESTION: YES
LAB AP HPV HR: NORMAL
LAB AP PREVIOUS ABNORMAL HISTORY: NORMAL
LABORATORY COMMENT REPORT: NORMAL
LMP START DATE: NORMAL
PATH REPORT.TOTAL CANCER: NORMAL

## 2024-12-26 ENCOUNTER — LAB (OUTPATIENT)
Dept: LAB | Facility: LAB | Age: 34
End: 2024-12-26
Payer: COMMERCIAL

## 2024-12-26 ENCOUNTER — APPOINTMENT (OUTPATIENT)
Dept: AUDIOLOGY | Facility: CLINIC | Age: 34
End: 2024-12-26
Payer: COMMERCIAL

## 2024-12-26 ENCOUNTER — HOSPITAL ENCOUNTER (OUTPATIENT)
Dept: RADIOLOGY | Facility: CLINIC | Age: 34
Discharge: HOME | End: 2024-12-26
Payer: COMMERCIAL

## 2024-12-26 DIAGNOSIS — J32.9 CHRONIC SINUSITIS, UNSPECIFIED LOCATION: ICD-10-CM

## 2024-12-26 DIAGNOSIS — G50.1 ATYPICAL FACE PAIN: ICD-10-CM

## 2024-12-26 DIAGNOSIS — R09.81 NASAL CONGESTION: ICD-10-CM

## 2024-12-26 PROCEDURE — 70486 CT MAXILLOFACIAL W/O DYE: CPT | Performed by: RADIOLOGY

## 2024-12-26 PROCEDURE — 70486 CT MAXILLOFACIAL W/O DYE: CPT

## 2024-12-26 PROCEDURE — 82784 ASSAY IGA/IGD/IGG/IGM EACH: CPT

## 2024-12-26 PROCEDURE — 36415 COLL VENOUS BLD VENIPUNCTURE: CPT

## 2024-12-26 PROCEDURE — 86317 IMMUNOASSAY INFECTIOUS AGENT: CPT

## 2024-12-27 LAB
IGG SERPL-MCNC: 1090 MG/DL (ref 700–1600)
IGG1 SER-MCNC: 701 MG/DL (ref 490–1140)
IGG2 SER-MCNC: 362 MG/DL (ref 150–640)
IGG3 SER-MCNC: 80 MG/DL (ref 11–85)
IGG4 SER-MCNC: 11 MG/DL (ref 3–200)

## 2024-12-28 LAB
S PN DA SERO 19F IGG SER-MCNC: 4.43 UG/ML
S PNEUM DA 1 IGG SER-MCNC: 0.26 UG/ML
S PNEUM DA 10A IGG SER-MCNC: 0.48 UG/ML
S PNEUM DA 11A IGG SER-MCNC: 0.76 UG/ML
S PNEUM DA 12F IGG SER-MCNC: 0.12 UG/ML
S PNEUM DA 14 IGG SER-MCNC: 0.4 UG/ML
S PNEUM DA 15B IGG SER-MCNC: 0.27 UG/ML
S PNEUM DA 17F IGG SER-MCNC: 1.35 UG/ML
S PNEUM DA 18C IGG SER-MCNC: 0.26 UG/ML
S PNEUM DA 19A IGG SER-MCNC: 1.6 UG/ML
S PNEUM DA 2 IGG SER-MCNC: 0.47 UG/ML
S PNEUM DA 20A IGG SER-MCNC: 0.84 UG/ML
S PNEUM DA 22F IGG SER-MCNC: 0.22 UG/ML
S PNEUM DA 23F IGG SER-MCNC: 0.6 UG/ML
S PNEUM DA 3 IGG SER-MCNC: 1.03 UG/ML
S PNEUM DA 33F IGG SER-MCNC: 3.63 UG/ML
S PNEUM DA 4 IGG SER-MCNC: 0.58 UG/ML
S PNEUM DA 5 IGG SER-MCNC: 0.1 UG/ML
S PNEUM DA 6B IGG SER-MCNC: 0.54 UG/ML
S PNEUM DA 7F IGG SER-MCNC: 0.27 UG/ML
S PNEUM DA 8 IGG SER-MCNC: 1.43 UG/ML
S PNEUM DA 9N IGG SER-MCNC: 0.29 UG/ML
S PNEUM DA 9V IGG SER-MCNC: 0.23 UG/ML
S PNEUM SEROTYPE IGG SER-IMP: NORMAL

## 2024-12-29 LAB — HAEM INFLU B IGG SER-MCNC: 6.5 UG/ML

## 2025-01-02 DIAGNOSIS — J32.9 CHRONIC SINUSITIS, UNSPECIFIED LOCATION: Primary | ICD-10-CM

## 2025-01-09 DIAGNOSIS — D80.6 ANTI-PNEUMOCOCCAL POLYSACCHARIDE ANTIBODY DEFICIENCY (MULTI): Primary | ICD-10-CM

## 2025-01-17 ENCOUNTER — APPOINTMENT (OUTPATIENT)
Dept: PRIMARY CARE | Facility: CLINIC | Age: 35
End: 2025-01-17
Payer: COMMERCIAL

## 2025-01-17 ENCOUNTER — APPOINTMENT (OUTPATIENT)
Dept: OTOLARYNGOLOGY | Facility: CLINIC | Age: 35
End: 2025-01-17
Payer: COMMERCIAL

## 2025-02-17 ENCOUNTER — APPOINTMENT (OUTPATIENT)
Dept: OTOLARYNGOLOGY | Facility: CLINIC | Age: 35
End: 2025-02-17
Payer: COMMERCIAL

## 2025-02-17 VITALS — HEIGHT: 62 IN | BODY MASS INDEX: 32.76 KG/M2 | WEIGHT: 178 LBS

## 2025-02-17 DIAGNOSIS — R44.8 FACIAL PRESSURE: ICD-10-CM

## 2025-02-17 DIAGNOSIS — J32.9 CHRONIC RHINOSINUSITIS: Primary | ICD-10-CM

## 2025-02-17 DIAGNOSIS — J34.89 NASAL DRAINAGE: ICD-10-CM

## 2025-02-17 DIAGNOSIS — J32.9 CHRONIC SINUSITIS, UNSPECIFIED LOCATION: ICD-10-CM

## 2025-02-17 DIAGNOSIS — J34.3 HYPERTROPHY OF BOTH INFERIOR NASAL TURBINATES: ICD-10-CM

## 2025-02-17 DIAGNOSIS — G50.1 ATYPICAL FACE PAIN: ICD-10-CM

## 2025-02-17 DIAGNOSIS — J34.89 NASAL OBSTRUCTION: ICD-10-CM

## 2025-02-17 DIAGNOSIS — R09.81 NASAL CONGESTION: ICD-10-CM

## 2025-02-17 DIAGNOSIS — R20.0 FACIAL NUMBNESS: ICD-10-CM

## 2025-02-17 PROCEDURE — 1036F TOBACCO NON-USER: CPT | Performed by: STUDENT IN AN ORGANIZED HEALTH CARE EDUCATION/TRAINING PROGRAM

## 2025-02-17 PROCEDURE — 99214 OFFICE O/P EST MOD 30 MIN: CPT | Performed by: STUDENT IN AN ORGANIZED HEALTH CARE EDUCATION/TRAINING PROGRAM

## 2025-02-17 PROCEDURE — 31231 NASAL ENDOSCOPY DX: CPT | Performed by: STUDENT IN AN ORGANIZED HEALTH CARE EDUCATION/TRAINING PROGRAM

## 2025-02-17 PROCEDURE — 3008F BODY MASS INDEX DOCD: CPT | Performed by: STUDENT IN AN ORGANIZED HEALTH CARE EDUCATION/TRAINING PROGRAM

## 2025-02-17 NOTE — PATIENT INSTRUCTIONS
What can I expect before and after surgery?     1) Before surgery   In preparation for your surgery, your physician may prescribe a preoperative regimen of medications in order to optimize the condition of your sinuses prior to surgery. The medications may include oral steroids (prednisone) that you start taking 7 days before surgery (don't take it on the day of surgery on) restart on postoperative day 1. Take the antibiotics AFTER the surgery. If any preoperative medications are deemed necessary by your physician, please be sure to start the medications on the day directed and adhere closely to the prescription. In addition, you should avoid taking aspirin and aspirin related products for at least 14 days prior to surgery. Aspirin, ibuprofen (Motrin/Advil), naproxen (Aleve), Excedrin (contains aspirin) and related products can thin the blood and create excessive bleeding both during the surgery and in the postoperative period. Tylenol is acceptable and may be taken any time up to the day of surgery.     2) After surgery   At your first postoperative visit any packs that may have been placed will be removed and your nose will be cleaned of clotted blood and debris. This is an uncomfortable procedure, so we recommend that you take a pain pill approximately one hour prior to your appointment, even if you have not been experiencing much postoperative pain up to that day. You will continue to have additional postoperative visits at an interval determined by your surgeon, until your sinuses have completely healed and are doing well. Sinus surgery does NOT eliminate the underlying causes that gave you sinusitis in the first place, so it is important that you continue your medical therapies (nasal spray, irrigation etc) even after surgery.     How do I prepare for surgery?   If your age and/or medical history indicate it, your physician may order testing prior to surgery such as blood tests, EKG, or chest x-ray. If these  have been ordered, please make sure they are completed before the day of surgery.   If you have a significant increase in your sinus infection in the week(s) prior to surgery, notify us. Your surgery may need to be postponed.   Make sure you have a  to take you home after surgery. You will NOT be allowed to leave the hospital by yourself or to drive yourself home.     DO NOT take aspirin or salicylate containing pain medication for at least ten days prior to surgery. Aspirin, even in small quantities, can significantly increase bleeding during surgery and postoperatively.     DO NOT take non-steroidal anti-inflammatory drugs (Ibuprofen, Advil, Motrin, Aleve, Naproxen) for at least five days prior to surgery. These drugs will also increase bleeding, although the effects on the blood are shorter. You may use Tylenol for pain control during this time if needed. Any other medications which thin the blood (such as Coumadin or Plavix) will need to be stopped prior to surgery. Your surgeon will discuss the timing of when to stop coumadin and whether another short acting medication such as Lovenox will be needed to use in place of the Coumadin while you are off it.     DO NOT smoke if at all possible for at least three weeks prior to surgery. Not only does smoking worsen sinus symptoms, smoking in the weeks before or after surgery will result in excessive scarring, and may result in failure of the operation.     DO NOT eat or drink anything beginning at midnight the night before surgery unless otherwise instructed by our colleagues in anesthesia. In most cases your normal morning medications should be taken with a small sip of water on the morning of surgery - your surgeon will let you know if there any exceptions to this.     NOTIFY YOUR SURGEON if you have a prosthesis or heart valve disorder that requires antibiotics at the time of surgery or if you have had prior difficulty with general anesthesia.     What will  happen during surgery?   The surgery is typically not uncomfortable and should not be an unpleasant experience. Depending on the several factors, the operation may be performed under general anesthesia or under local anesthesia with an anesthesiologist providing monitored sedation.     Regardless of anesthetic method chosen, in the preoperative area an intravenous line will be started to administer fluid and you will be given medicines in your IV to help you relax.   Once in the operating room, if local anesthesia is chosen, you will be given additional IV medication to keep you sleepy and relaxed. Medicated pieces of cotton will be placed in your nose and several injections of local anesthetic will be administered inside your nose. The anesthesiologist will make you extra sleepy during this process. Should you experience significant discomfort during the procedure, we will provide additional monitored sedation.     If you are receiving general anesthesia, a temporary tube is inserted into you mouth/throat after you are completely asleep to maintain your breathing. If the planned surgery is for more than 3 hours duration, a urinary catheter may also be placed while you are asleep. It is usually removed before you leave the recovery room. It is rare that a catheter is necessary. When the surgery is completed, the breathing tube is removed from your airway before you regain consciousness. Patients occasionally report sore throat and/or nausea postoperatively as a result of the breathing tube and anesthetic.     What can I expect following my surgery?   Some bloody postnasal discharge may occur for approximately two weeks after this procedure. This is normal and slowly improves. You SHOULD NOT blow your nose for at least seven days following surgery. Since nasal packing is usually not used, you will have some bleeding from your nose and you may go through more than one box of tissues by spotting them with blood during  the first 24-48 hours after surgery. As the sinuses begin to clear themselves, you can expect to have some thick brown drainage from your nose. This is mucus and old blood and does not indicate an infection. You may experience some discomfort postoperatively due to manipulation and inflammation. Take your pain medication as directed. Often extra-strength Tylenol® is sufficient.     On the the first day after surgery you should begin irrigating six times a day with saline using the [facility] Sinus Rinse bottle. If you have had packing placed in your nose, you will not be able to do these things until the packing is removed. Your surgeon will inform you if packing has been placed.   The first follow-up visit is usually arranged at approximately one week after surgery to clean clot and crusts from the nose. This visit is usually uncomfortable and we recommend that you take a dose of your pain medication about one hour prior to the visit. If you are using narcotic pain medicine, you will need a . Consent to the surgery also includes consent to this postoperative care.     Careful postoperative care by both you and your surgeon is essential to the success of the surgery. It is very important that you follow these instructions, as well as any additional instructions given by us, to promote healing and decrease the chance of complications from the surgery.     SOME IMPORTANT POSTOPERATIVE DO´S AND DON´TS AFTER SURGERY     ° DO NOT blow your nose until you have been given permission to do so (usually one week following surgery).     ° DO NOT bend, lift or strain for at lease one week after surgery. These activities will promote bleeding from your nose. You should not plan on participating in any rigorous activity until healing is completed.     ° DO NOT suppress the need to cough or sneeze, but cough/sneeze with your mouth open.     ° DO NOT resume use of any aspirin-containing products or other blood thinners until  after discussing this with us. Typically, you can restart after 7-10 days.     ° DO NOT overuse Tylenol (acetaminophen). Extra-strength Tylenol can be used for pain but can be harmful to your liver if used overdosed. Your prescription narcotic pain medicine also contains Tylenol, so it is important that you don´t use both at the same time. Do not exceed 2000 mg of Tylenol in 24 hours.     ° DO use nasal saline spray (without decongestant) every hour while you are awake beginning the day after surgery. This helps moisten your nose and prevents large crusts from forming.    ° DO use nasal saline irrigation at least six times daily beginning on day after surgery     ° DO take your antibiotics (if they have been prescribed for you). Mild diarrhea from antibiotic usage is common. This can often be prevented by taking acidophilus daily, which is found in yogurt with active cultures or as tablets in a health food store. If you should experience severe persistent diarrhea, this may be indicative of another health problem. In this case stop the antibiotic and notify us. Further evaluation may be required.     ° DO notify us for any of the following: temperature elevations above 100.5 F, clear watery drainage from your nose, changes in vision, swelling of the eyes, worsening headache or neck stiffness.     ° DO use a Qtip to place antibiotic ointment in the very front inside your nostrils as needed to minimize or soften crusting. Neosporin, bacitracin or double/triple antibiotic ointments are all OK to use, up to twice a day     ° DO resume your normal preoperative medications (except aspirin or other blood thinners as noted above)    For the first week following surgery you should not blow your nose or perform any activities requiring exertion such as bending, straining, exercising or lifting anything heavier than 10 pounds. You should not plan any air travel in the first week after surgery.   Ultimately, it will take about  2-3 months for you to fully recover from surgery

## 2025-02-17 NOTE — PROGRESS NOTES
HPI  35-year-old female presenting for initial evaluation of multiple ENT complaints.  Endorses multiple sinonasal issues  Main Symptoms: Daily sinonasal symptoms include bilateral nasal congestion/obstruction, anterior and posterior bilateral mucoid nasal drainage.  Duration: Years  Laterality: Bilateral  Has received multiple courses of antibiotics for the past year without significant improvement of her symptoms.  Also endorses right facial numbness/ tingling and occasional right facial pain for approximately 1 year.    Patient denies anosmia, vision changes, dental pain, immunotherapy.    No odynophagia/dysphagia/SOB/dyspnea/hoarseness/fevers/chills/weight loss/night sweats.    Current treatment:  Nasal Steroid: Flonase   Sinus Rinse: No  Antihistamine: Cetirizine PRN   Prednisone: No  Other: None    Recent CT: CT sinus 2024 notable for complete left maxillary sinus opacification.  Rest of paranasal sinuses showing no evidence of sinusitis.    Previous nasal/sinus surgery: None     In addition the patient reports occasional right aural fullness, crackling/popping.  Denies sudden hearing changes, otorrhea, episodes of otitis, vertigo, tinnitus, autophony.      Past Medical History:   Diagnosis Date    Abnormal Pap smear of cervix 2019    Formatting of this note might be different from the original. 29 y.o.  Paragard for contraception Former smoker Normal paps before this 2019: ASCUS, HPV other + 2019: colpo adequate, acetowhite changes at 10-12, some hypervascularity. Biopsy 1 o'clock MATTEO I Plan: co-testing in 1 year    HPV in female 2021        Past Surgical History:   Procedure Laterality Date     SECTION, LOW TRANSVERSE          Current Outpatient Medications   Medication Instructions    albuterol (ProAir HFA) 90 mcg/actuation inhaler 2 puffs, inhalation, Every 4 hours PRN    b complex 0.4 mg tablet 1 tablet, Daily    cetirizine HCl (ZYRTEC ORAL) Daily    fluticasone  propionate (FLONASE NASL) Administer into affected nostril(s).        No Known Allergies     Review of Systems  A detailed 12 point ROS was performed and is negative except as noted in the intake form, HPI and/or Past Medical History    Physical Exam   CONSTITUTIONAL: Well developed, well nourished.  VOICE: Normal voice quality  RESPIRATION: Breathing comfortably, no stridor.  CV: No clubbing/cyanosis/edema in hands.  EYES: EOM Intact, sclera normal.  NEURO: Alert and oriented times 3, Cranial nerves V,VII intact and symmetric bilaterally.  HEAD AND FACE: Symmetric facial features, no masses or lesions, sinuses nontender to palpation.  SALIVARY GLANDS: Parotid and submandibular glands normal bilaterally.  EARS: Normal external ears, external auditory canals, and TMs to otoscopy  Rinne A>B bilaterally  NOSE: External nose midline, anterior rhinoscopy is normal with limited visualization to the anterior aspect of the interior turbinates. No lesions noted.  ORAL CAVITY/OROPHARYNX/LIPS: Normal mucous membranes, normal floor of mouth/tongue/OP, no masses or lesions are noted.  PHARYNGEAL WALLS AND NASOPHARYNX: No masses noted. Mucosa appears clean and moist  NECK/LYMPH: No LAD, no thyroid masses. Trachea palpably midline  SKIN: Neck skin is without injury  PSYCH: Alert and oriented with appropriate mood and affect     Nasal endoscopy:  PROCEDURE NOTE    For better visualization because of septal deviation, turbinate hypertrophy nasal endoscopy was performed after verbal consent was obtained by the patient and/or guardian. Both nostrils were sprayed with a mixture of lidocaine 4% and Afrin. After a sufficient amount of time elapsed for mucosal anesthesia to take place, the nasal endoscope was advanced into the nostril.    The following areas were visualized:  Nasal passage, nasal septum, turbinates, middle meatus, nasopharynx, sinus ostia    The patient tolerated the procedure well and these structures were found to be  normal except as follows:  Bilateral inferior turbinate hypertrophy  Septum mostly midline with slight left inferior caudal spur  No nasopharyngeal lesions/masses  No mucopurulence, polyps, nor masses seen in inferior meati, middle meati, nor sphenoethmoidal recesses bilaterally.     Results:   Audiogram 11/26/2024 personally reviewed  Right: Mild conductive hearing loss at 125 and 250 Hz, otherwise normal hearing.  Speech discrimination score 90%.  Type A tympanogram (slightly negative).   Left: Normal hearing.  Speech discrimination score 100%.  Type A tympanogram    Assessment  Chronic rhinosinusitis   Facial pressure  Nasal drainage  Nasal airway obstruction  Nasal septal deviation  Bilateral inferior turbinate hypertrophy  Right facial numbness  Right conductive hearing loss      Plan  - CRS, MAXI  The patient and I discussed their current nasal / sinus symptoms as well as several therapeutic options.  Reports no significant improvement of her sinonasal symptomatology following adequate medical therapy.  Multiple treatment alternatives, risks and benefits discussed, she will like to proceed with surgery including left functional endoscopic sinus surgery, bilateral inferior turbinate reduction/outfracture.  The patient and I discussed the aspects of the proposed surgery today. We discussed alternative therapies, which include doing nothing and continuing medical therapy. We discussed the fact that surgery offers an opportunity to correct any anatomic deformities and to relieve a problem that has not been improved with medical therapy. The anticipated benefits from endoscopic surgery include but are not limited to: eradication of sinus disease, relief from pain and symptoms. The risks and complications associated with this surgery were discussed. These include, but were not limited to: anesthesia/medication complications (cva, mi, death), heart/lung/brain dysfunction, synechiae formation, poor cosmetic result  possibly requiring reconstructive surgery, septal perforation possibly requiring reconstructive surgery, bleeding, infection, postoperative pain, nerve damage, numbness, failure to relieve pain and other preoperative symptoms, visual acuity changes (temporary or permanent), loss of smell/taste, csf leak, need for further surgeries in future. Each of these potential complications was discussed in detail.  No guarantees were implied and the possibility of recurrence was discussed and understood. The nature of the procedure and the pre and postoperative processes were discussed as well.     The patient understands, asked appropriate questions, and wishes to proceed with surgery.   Previous immune deficiency testing performed by David Alonso PA-C notable for low strep pneumo titers, scheduled for Pneumovax vaccine.     -Right facial numbness/tingling  Reports occasional episodes of right facial numbness/tingling and discomfort for over 1 year, this is contralateral to her opacified left maxillary sinus and likely neurogenic treatment.  She was referred to neurology for further evaluation.    - Right CHL, ETD   Audiogram 11/26/24 notable for mild right low-frequency conductive hearing loss with borderline right negative middle ear pressure.  Today tuning fork exam were normal bilaterally.  No evidence of middle ear effusion noted.   Symptoms possibly driven by ETD vs early otosclerosis.  We will proceed with follow-up audiogram in 1 year.

## 2025-03-31 ENCOUNTER — APPOINTMENT (OUTPATIENT)
Dept: PRIMARY CARE | Facility: CLINIC | Age: 35
End: 2025-03-31
Payer: COMMERCIAL

## 2025-03-31 VITALS
HEART RATE: 86 BPM | DIASTOLIC BLOOD PRESSURE: 64 MMHG | TEMPERATURE: 98.1 F | SYSTOLIC BLOOD PRESSURE: 100 MMHG | BODY MASS INDEX: 33.03 KG/M2 | OXYGEN SATURATION: 99 % | WEIGHT: 180.6 LBS

## 2025-03-31 DIAGNOSIS — M54.2 NECK PAIN: ICD-10-CM

## 2025-03-31 DIAGNOSIS — F41.9 ANXIETY: Primary | ICD-10-CM

## 2025-03-31 DIAGNOSIS — R20.2 FACIAL TINGLING: ICD-10-CM

## 2025-03-31 PROCEDURE — 99214 OFFICE O/P EST MOD 30 MIN: CPT | Performed by: NURSE PRACTITIONER

## 2025-03-31 PROCEDURE — 1036F TOBACCO NON-USER: CPT | Performed by: NURSE PRACTITIONER

## 2025-03-31 RX ORDER — ESCITALOPRAM OXALATE 10 MG/1
10 TABLET ORAL DAILY
Qty: 30 TABLET | Refills: 1 | Status: SHIPPED | OUTPATIENT
Start: 2025-03-31

## 2025-03-31 ASSESSMENT — ENCOUNTER SYMPTOMS
CHEST TIGHTNESS: 0
ABDOMINAL PAIN: 0
DYSPHORIC MOOD: 0
CHILLS: 0
DIZZINESS: 1
NUMBNESS: 0
SHORTNESS OF BREATH: 0
DIARRHEA: 0
NAUSEA: 0
NECK STIFFNESS: 1
FEVER: 0
WEAKNESS: 0
SLEEP DISTURBANCE: 0
VOMITING: 0
PALPITATIONS: 0
COUGH: 0
HEADACHES: 0
NECK PAIN: 1
NERVOUS/ANXIOUS: 1
FATIGUE: 0

## 2025-03-31 NOTE — PROGRESS NOTES
Subjective   Bertha Antonio is a 35 y.o. female who presents for Earache (Right side. Has been checked by ENT. Is having ear pain w/no outward symptoms. Sometimes tingling on right side of the face. Last 2 weeks it has hurt to turn her neck.).    HPI  She presents to the office today to discus tingling in her face. She reports she has had some tingling in the right temporal region. There is no associated pain.  This has been present intermittently for a year.   It can last a few minutes or intermittently all day.   Had one episode where vision became blurry- symptoms went away.  Occasionally feels like her right eye can get blurry.  (+) headaches in the back of the head which have been worse in the past month or so  (+) neck pain on the right side- feels like she gets a cramp in the back of the head.   (+) pain in the right ear with an ache.   (+) popping and discomfort in the right TMJ with opening and closing.  This never wakes her up at night.  Feels like she can feel fluid draining through her neck.   Taste and smell are fine.    ? TMJ  ? Dental issue- has had some fillings that keep coming out.  No fever or chills.  No other numbness, tingling, weakness or dizziness.  Will have dizziness with neck pain.  No nausea or vomiting.   Has an appointment with neurology in June 2025.    She reports she also has been struggling with anxiety.   This has been an issue since cassidy was diagnosed with cancer and underwent treatment. Now she has a child and worries about her own health as well.   No feeling sad, down, helpless or hopeless.   No SI or HI.   Motivation is not always great.   (+) excessive worry.  (+) worry about worst case scenarios.  Will spiral and is mainly about health  Then get fixated on what is wrong with her and becomes withdrawn- does not want to leave the house.  Affects her interactions at home- less present.  Sleeping ok a night.     Review of Systems   Constitutional:  Negative for chills,  fatigue and fever.   HENT:  Positive for ear pain.    Eyes:  Negative for visual disturbance.   Respiratory:  Negative for cough, chest tightness and shortness of breath.    Cardiovascular:  Negative for chest pain, palpitations and leg swelling.   Gastrointestinal:  Negative for abdominal pain, diarrhea, nausea and vomiting.   Musculoskeletal:  Positive for neck pain and neck stiffness.   Neurological:  Positive for dizziness. Negative for weakness, numbness and headaches.   Psychiatric/Behavioral:  Negative for dysphoric mood, self-injury, sleep disturbance and suicidal ideas. The patient is nervous/anxious.        Objective   /64 (BP Location: Right arm, Patient Position: Sitting, BP Cuff Size: Adult)   Pulse 86   Temp 36.7 °C (98.1 °F) (Temporal)   Wt 81.9 kg (180 lb 9.6 oz)   SpO2 99%   BMI 33.03 kg/m²     Physical Exam  Constitutional:       General: She is not in acute distress.     Appearance: Normal appearance. She is not toxic-appearing.   HENT:      Head:      Jaw: Tenderness present.        Comments: (+) slight tenderness noted in right TMJ with opening and closing.     Right Ear: Hearing, tympanic membrane, ear canal and external ear normal.      Left Ear: Hearing, tympanic membrane, ear canal and external ear normal.   Eyes:      Extraocular Movements: Extraocular movements intact.      Conjunctiva/sclera: Conjunctivae normal.      Pupils: Pupils are equal, round, and reactive to light.   Neck:      Thyroid: No thyroid mass or thyromegaly.   Cardiovascular:      Rate and Rhythm: Normal rate and regular rhythm.      Pulses: Normal pulses.      Heart sounds: Normal heart sounds, S1 normal and S2 normal. No murmur heard.  Pulmonary:      Effort: Pulmonary effort is normal. No respiratory distress.      Breath sounds: Normal breath sounds and air entry.   Abdominal:      General: Bowel sounds are normal.      Palpations: Abdomen is soft.      Tenderness: There is no abdominal tenderness.    Musculoskeletal:        Back:       Right lower leg: No edema.      Left lower leg: No edema.      Comments: (+) Tenderness noted in the area above   Lymphadenopathy:      Cervical: No cervical adenopathy.   Neurological:      General: No focal deficit present.      Mental Status: She is alert and oriented to person, place, and time.      Cranial Nerves: Cranial nerves 2-12 are intact.      Sensory: Sensation is intact.      Motor: Motor function is intact.      Coordination: Coordination is intact.      Gait: Gait is intact.      Deep Tendon Reflexes: Reflexes are normal and symmetric.   Psychiatric:         Attention and Perception: Attention normal.         Mood and Affect: Mood and affect normal.         Behavior: Behavior normal. Behavior is cooperative.         Thought Content: Thought content normal.         Cognition and Memory: Cognition normal.         Judgment: Judgment normal.         Assessment/Plan   Problem List Items Addressed This Visit       Facial tingling    Neck pain    Anxiety - Primary    Relevant Medications    escitalopram (Lexapro) 10 mg tablet     She has a normal neuro exam today. Discussed red flags which require urgent evaluation and include severe headache, vision changes, numbness, tingling, weakness, or speech difficulty.   Will trial NSAID 3 times a day for about 3 days with food to see if there is any improvement in symptoms.  Will also start Lexapro for anxiety and plan to follow up in 6 weeks.    It has been a pleasure seeing you today!

## 2025-03-31 NOTE — PATIENT INSTRUCTIONS
Begin taking the Lexapro. Take 5 mg daily to start, then increase to 10 mg daily if tolerating.  Taker Ibuprofen 400-600 mg 3 times a day with food for the next 3 days and keep me posted on symptoms.   Follow up in 6 weeks.

## 2025-05-14 ENCOUNTER — APPOINTMENT (OUTPATIENT)
Dept: PRIMARY CARE | Facility: CLINIC | Age: 35
End: 2025-05-14
Payer: COMMERCIAL

## 2025-05-14 DIAGNOSIS — F32.A DEPRESSION, UNSPECIFIED DEPRESSION TYPE: ICD-10-CM

## 2025-05-14 DIAGNOSIS — F41.9 ANXIETY: Primary | ICD-10-CM

## 2025-05-14 PROCEDURE — 1036F TOBACCO NON-USER: CPT | Performed by: NURSE PRACTITIONER

## 2025-05-14 PROCEDURE — 99213 OFFICE O/P EST LOW 20 MIN: CPT | Performed by: NURSE PRACTITIONER

## 2025-05-14 ASSESSMENT — ENCOUNTER SYMPTOMS
DYSPHORIC MOOD: 0
SLEEP DISTURBANCE: 0
CHILLS: 0
NERVOUS/ANXIOUS: 0
FEVER: 0
FATIGUE: 0

## 2025-05-14 NOTE — ASSESSMENT & PLAN NOTE
Overall better.  She may consider starting the Lexapro.  Recommended follow-up in 6 to 8 weeks after starting medication if she decides to start it.

## 2025-05-14 NOTE — PROGRESS NOTES
Subjective    Bertha Antonio is a 35 y.o. female who presents for 6 wk fu on virtual (Pt is in [place]. ).    Virtual or Telephone Consent    An interactive audio and video telecommunication system which permits real time communications between the patient (at the originating site) and provider (at the distant site) was utilized to provide this telehealth service.   Verbal consent was requested and obtained from Bertha Antonio on this date, 05/14/25 for a telehealth visit and the patient's location was confirmed at the time of the visit. She was located in [place] for the visit today.    HPI  She presents today virtually for follow-up on anxiety.  She reports she never started the Lexapro.  Overall she feels things have been going well.  Less anxious recently.  This is why she did not start it.  No feeling sad, down, helpless or hopeless.  Motivation is good.  No SI or HI.  No excessive worry.  No worry about worst case scenarios.  No panic attacks.  Sleeping okay at night.  She reports she has not had any physical symptoms of anxiety.  Since her last visit she tried ibuprofen around-the-clock for few days.  She reports this did improve her neck pain.  She feels this is likely musculoskeletal in nature.  She does continue to have tingling in the left temporal region.  This at times goes into the cheek.  She notes it tends to be worse when she bites down.  She believes this could be related to TMJ now.      Review of Systems   Constitutional:  Negative for chills, fatigue and fever.   Psychiatric/Behavioral:  Negative for dysphoric mood, self-injury, sleep disturbance and suicidal ideas. The patient is not nervous/anxious.        Objective   There were no vitals taken for this visit.    Physical Exam  Constitutional:       General: She is not in acute distress.     Appearance: Normal appearance. She is not toxic-appearing.   Pulmonary:      Effort: Pulmonary effort is normal. No respiratory distress.   Neurological:       Mental Status: She is alert and oriented to person, place, and time.   Psychiatric:         Mood and Affect: Mood normal.         Behavior: Behavior normal.         Thought Content: Thought content normal.         Judgment: Judgment normal.         Assessment/Plan   Problem List Items Addressed This Visit       Depression    Stable.  No depression noted today.         Anxiety - Primary    Overall better.  She may consider starting the Lexapro.  Recommended follow-up in 6 to 8 weeks after starting medication if she decides to start it.          It has been a pleasure seeing you today!

## 2025-05-23 ENCOUNTER — APPOINTMENT (OUTPATIENT)
Dept: OTOLARYNGOLOGY | Facility: CLINIC | Age: 35
End: 2025-05-23
Payer: COMMERCIAL

## 2025-06-13 ENCOUNTER — APPOINTMENT (OUTPATIENT)
Dept: OTOLARYNGOLOGY | Facility: CLINIC | Age: 35
End: 2025-06-13
Payer: COMMERCIAL

## 2025-06-27 ENCOUNTER — APPOINTMENT (OUTPATIENT)
Dept: OTOLARYNGOLOGY | Facility: CLINIC | Age: 35
End: 2025-06-27
Payer: COMMERCIAL

## 2025-06-29 NOTE — PROGRESS NOTES
[facility]   Referring: Juan Clinton MD  PCP: Ana Robbins, TRESA-CNP  Date of service: 6/30/25    Chief Complaint   Patient presents with    Numbness     NPV, numbness/tingling only on right side of face in temple and cheek area for 1 year now w/ some blurred vision near menses. Seen by ENT and ruled out TMJ, pt does have some sinus blockage on left side per ENT.       HISTORY OF PRESENT ILLNESS     Subjective     Bertha Antonio is a 35 y.o. female who presents for initial evaluation  of facial paresthesia. Past medical history is significant for anxiety/depression, asthma, chronic rhinosinusitis.     Since March/April 2024, Bertha has had intermittent right face paresthesia. Initially at that time, she had been sick with a cold and started getting tingling on the right side of her face. However, the episodes of continuous numbness/tingling persisted despite her recovering from her cold. Paresthesia is only the right side of her face, primarily around her eye and can radiate down into her right neck. Frequency of episodes can vary, at times lasting only 30 minutes while other times it can seem to last all day. Sometimes she can have the sensation every day while other times it occurs less frequently, about every 2 weeks or so. No clear triggers identified for events. She did see ENT for this who diagnosed her with left sinus blockage that could benefit from surgical intervention but is likely not causing her symptoms.     She denies associated pain with paresthesia per se but sensation can be annoying and can be associated with mild tension-type headaches (in her early 20s she had migraines for which she had been prescribed imitrex but these have since stopped). Occasionally she will have intermittent blurry vision that is not always related to the right face pareshtesia. She saw her eye doctor for this in April 2025 and was told that her eyes looked ok. She denies paresthesia  in other areas of her body, right facial droop, dysarthria, dysphagia, diplopia, dysphonia, seizure or symptoms on the left side of her face. She has never been diagnosed with autonimmune condition or malignancy.     FHx: no family history of neurological conditions    SHx: denies current use of tobacco, heavy alcohol use or marijuana     Problem List[1]  Medical History[2]  Surgical History[3]  Social History     Tobacco Use    Smoking status: Former     Current packs/day: 0.00     Average packs/day: 0.5 packs/day for 10.0 years (5.0 ttl pk-yrs)     Types: Cigarettes     Start date: 2006     Quit date: 2016     Years since quittin.5     Passive exposure: Current    Smokeless tobacco: Never   Substance Use Topics    Alcohol use: Yes     Comment: very rare     family history includes Breast cancer in her mother's sister; COPD in her mother and mother; Dementia in her paternal grandmother; Diabetes in her maternal grandfather, maternal grandfather, paternal grandmother, and paternal grandmother; Heart disease in her maternal grandfather, maternal grandfather, paternal grandmother, and paternal grandmother; No Known Problems in her father; Vision loss in her paternal grandmother and paternal grandmother.    Current Outpatient Medications   Medication Instructions    albuterol (ProAir HFA) 90 mcg/actuation inhaler 2 puffs, inhalation, Every 4 hours PRN     Allergies[4]    PHYSICAL EXAM     Objective   Neurological Exam  Physical Exam    Neurologic Exam:    Mental Status:   Memory: intact short term memory   Attention/Concentration: intact attention on bedside test   Fund of knowledge: intact   Orientation: oriented to date location and person   Language: normal fluency comprehension repetition and naming   Speech: is fluent, no dysarthria    Cranial Nerves:  Pupils: OD 4 mm to 3 mm; OS 4 mm to 3 mm (no RAPD)  Visual Fields: (R) visual field full to confrontation; (L) visual field full to  confrontation  Optic Discs: (R) disc appears normal; (L) disc appears normal  CN III, CN IV, CN VI (Extraocular movements): extraocular eye movements intact.    CN V:  decreased light touch and pinprick anterior to right ear  CN VII: normal facial muscle strength bilaterally  CN VIII: auditory acuity intact to bedside testing  CN IX/CN X: normal palate elevation  CN XI: normal strength of trapezius and SCM muscles, bilaterally.  CN XII: tongue protrudes in the midline, no atrophy or fasciculations    Motor Exam:   Muscle Bulk: No atrophy or fasciculations   Muscle Tone: physiologic tone in upper and lower extremities   Involuntary movements: none  Pronator drift: absent   Strength:  [id number]     [id number]     [id number]    Sensory:   Light touch:intact in all 4 extremities.   Pain:intact in all 4 extremities.   Vibration:intact in all 4 extremities.     Romberg:   Intact without sway.    Reflexes:   RL  B2+2+   T  2+2+  BR 2+2+   P2+2+   A2+2+  ToesDownDown  No hopkins present bilaterally     Coordination:   Normal: Rapid alternating movements are performed with normal speed, amplitude and rhythm; finger to nose and heel-knee-shin movements performed accurately and without dysmetria.     Gait:   Normal Gait and Station: Arises independently; normal posture; gait stable with normal stride length, rate, base and arm swing. Heel, toe tandem gait performed without difficulty.     RESULTS   Data reviewed:  Lab Results   Component Value Date    HGBA1C 4.5 06/01/2021    TSH 1.69 08/06/2024     CT sinus wo 12/27/2024  IMPRESSION:  Near-complete opacification of the left maxillary sinus. Remaining paranasal sinuses are clear.  Variant anatomy as detailed most notably being complete sellar pneumatization of the right sphenoid sinus.    No EEG results found for the past 12 months  No EMG results found for the past 12 months  No MRI head results found for the past 12 months    IMPRESSION AND PLAN   Bertha  Paco is a 36 yo woman with PMHx of anxiety who presents with intermittent right facial paresthesia over the last year. This is at times associated with mild headache and blurry vision but denies bulbar symptoms. On neuro exam, pertinent findings include decreased light touch and pinprick on right cheek anterior to ear; remainder of neuro exam reassuring. Will obtain MRI of brain with close attention to brainstem and serum studies listed below to further evaluate.     RTC in 6-8 weeks    Patient understands and agrees to plan    Diagnoses and all orders for this visit:  Facial numbness  -     Referral to Neurology  -     Follow Up In Neurology; Future  Atypical face pain  -     Referral to Neurology  -     TSH with reflex to Free T4 if abnormal; Future  -     Vitamin B12; Future  -     Methylmalonic Acid; Future  -     Serum Protein Electrophoresis; Future  -     SANNA + YOKASTA Panel; Future  -     MR brain w and wo IV contrast; Future  -     Sedimentation Rate; Future  -     C-Reactive Protein; Future        Patient Instructions   I have ordered blood work and a MRI of your brain.    Follow up in about 6 weeks via telehealth.     Melissa Tafoya, DO  [facility]    Billing and Coding Determination:  Medical decision making was moderate complexity. The patient was seen and evaluated for 1 undiagnosed new problem with uncertain prognosis. I personally reviewed multiple external notes and multiple test results from multiple sources in the EHR. I independently interpreted the patient's labwork. Mri brain and additional labwork were ordered for further assessment.          [1]   Patient Active Problem List  Diagnosis    S/P  section    Health care maintenance    Breast pain, left    Other fatigue    Depression    Contact with and (suspected) exposure to covid-19    Chest pain    Pain in the coccyx    Chronic sinusitis    Nasal congestion    Atypical face pain    Facial tingling    Neck pain     Anxiety   [2]   Past Medical History:  Diagnosis Date    Abnormal Pap smear of cervix 2019    Formatting of this note might be different from the original. 29 y.o.  Paragard for contraception Former smoker Normal paps before this 2019: ASCUS, HPV other + 2019: colpo adequate, acetowhite changes at 10-12, some hypervascularity. Biopsy 1 o'clock MATTEO I Plan: co-testing in 1 year    Headache     HPV in female 2021   [3]   Past Surgical History:  Procedure Laterality Date     SECTION, LOW TRANSVERSE     [4] No Known Allergies

## 2025-06-30 ENCOUNTER — APPOINTMENT (OUTPATIENT)
Dept: NEUROLOGY | Facility: CLINIC | Age: 35
End: 2025-06-30
Payer: COMMERCIAL

## 2025-06-30 VITALS
SYSTOLIC BLOOD PRESSURE: 137 MMHG | WEIGHT: 183 LBS | HEIGHT: 62 IN | DIASTOLIC BLOOD PRESSURE: 80 MMHG | BODY MASS INDEX: 33.68 KG/M2 | HEART RATE: 95 BPM

## 2025-06-30 DIAGNOSIS — R20.0 FACIAL NUMBNESS: ICD-10-CM

## 2025-06-30 DIAGNOSIS — G50.1 ATYPICAL FACE PAIN: ICD-10-CM

## 2025-06-30 PROCEDURE — 99204 OFFICE O/P NEW MOD 45 MIN: CPT | Performed by: STUDENT IN AN ORGANIZED HEALTH CARE EDUCATION/TRAINING PROGRAM

## 2025-06-30 PROCEDURE — 3008F BODY MASS INDEX DOCD: CPT | Performed by: STUDENT IN AN ORGANIZED HEALTH CARE EDUCATION/TRAINING PROGRAM

## 2025-06-30 ASSESSMENT — PATIENT HEALTH QUESTIONNAIRE - PHQ9
1. LITTLE INTEREST OR PLEASURE IN DOING THINGS: NOT AT ALL
2. FEELING DOWN, DEPRESSED OR HOPELESS: NOT AT ALL
SUM OF ALL RESPONSES TO PHQ9 QUESTIONS 1 & 2: 0

## 2025-06-30 NOTE — LETTER
July 11, 2025     SLIME Guido  [address]  [facility], [address]  [facility] [address]    Patient: Bertha Antonio   YOB: 1990   Date of Visit: 6/30/2025       Dear SLIME Zepeda:    Thank you for referring Bertha Antonio to me for evaluation. Below are my notes for this consultation.  If you have questions, please do not hesitate to call me. I look forward to following your patient along with you.       Sincerely,     Melissa CERRATO Below, DO      CC: No Recipients  ______________________________________________________________________________________       [facility]   Referring: Juan Clinton MD  PCP: SLIME Guido  Date of service: 6/30/25    Chief Complaint   Patient presents with   • Numbness     NPV, numbness/tingling only on right side of face in temple and cheek area for 1 year now w/ some blurred vision near menses. Seen by ENT and ruled out TMJ, pt does have some sinus blockage on left side per ENT.       HISTORY OF PRESENT ILLNESS     Subjective    Bertha Antonio is a 35 y.o. female who presents for initial evaluation  of facial paresthesia. Past medical history is significant for anxiety/depression, asthma, chronic rhinosinusitis.     Since March/April 2024, Bertha has had intermittent right face paresthesia. Initially at that time, she had been sick with a cold and started getting tingling on the right side of her face. However, the episodes of continuous numbness/tingling persisted despite her recovering from her cold. Paresthesia is only the right side of her face, primarily around her eye and can radiate down into her right neck. Frequency of episodes can vary, at times lasting only 30 minutes while other times it can seem to last all day. Sometimes she can have the sensation every day while other times it occurs less frequently, about every 2 weeks or so. No clear triggers identified for events. She  did see ENT for this who diagnosed her with left sinus blockage that could benefit from surgical intervention but is likely not causing her symptoms.     She denies associated pain with paresthesia per se but sensation can be annoying and can be associated with mild tension-type headaches (in her early 20s she had migraines for which she had been prescribed imitrex but these have since stopped). Occasionally she will have intermittent blurry vision that is not always related to the right face pareshtesia. She saw her eye doctor for this in 2025 and was told that her eyes looked ok. She denies paresthesia in other areas of her body, right facial droop, dysarthria, dysphagia, diplopia, dysphonia, seizure or symptoms on the left side of her face. She has never been diagnosed with autonimmune condition or malignancy.     FHx: no family history of neurological conditions    SHx: denies current use of tobacco, heavy alcohol use or marijuana     Problem List[1]  Medical History[2]  Surgical History[3]  Social History     Tobacco Use   • Smoking status: Former     Current packs/day: 0.00     Average packs/day: 0.5 packs/day for 10.0 years (5.0 ttl pk-yrs)     Types: Cigarettes     Start date: 2006     Quit date: 2016     Years since quittin.5     Passive exposure: Current   • Smokeless tobacco: Never   Substance Use Topics   • Alcohol use: Yes     Comment: very rare     family history includes Breast cancer in her mother's sister; COPD in her mother and mother; Dementia in her paternal grandmother; Diabetes in her maternal grandfather, maternal grandfather, paternal grandmother, and paternal grandmother; Heart disease in her maternal grandfather, maternal grandfather, paternal grandmother, and paternal grandmother; No Known Problems in her father; Vision loss in her paternal grandmother and paternal grandmother.    Current Outpatient Medications   Medication Instructions   • albuterol (ProAir HFA) 90  mcg/actuation inhaler 2 puffs, inhalation, Every 4 hours PRN     Allergies[4]    PHYSICAL EXAM     Objective  Neurological Exam  Physical Exam    Neurologic Exam:    Mental Status:   Memory: intact short term memory   Attention/Concentration: intact attention on bedside test   Fund of knowledge: intact   Orientation: oriented to date location and person   Language: normal fluency comprehension repetition and naming   Speech: is fluent, no dysarthria    Cranial Nerves:  Pupils: OD 4 mm to 3 mm; OS 4 mm to 3 mm (no RAPD)  Visual Fields: (R) visual field full to confrontation; (L) visual field full to confrontation  Optic Discs: (R) disc appears normal; (L) disc appears normal  CN III, CN IV, CN VI (Extraocular movements): extraocular eye movements intact.    CN V:  decreased light touch and pinprick anterior to right ear  CN VII: normal facial muscle strength bilaterally  CN VIII: auditory acuity intact to bedside testing  CN IX/CN X: normal palate elevation  CN XI: normal strength of trapezius and SCM muscles, bilaterally.  CN XII: tongue protrudes in the midline, no atrophy or fasciculations    Motor Exam:   Muscle Bulk: No atrophy or fasciculations   Muscle Tone: physiologic tone in upper and lower extremities   Involuntary movements: none  Pronator drift: absent   Strength:  [id number]     [id number]     [id number]    Sensory:   Light touch:intact in all 4 extremities.   Pain:intact in all 4 extremities.   Vibration:intact in all 4 extremities.     Romberg:   Intact without sway.    Reflexes:   RL  B2+2+   T  2+2+  BR 2+2+   P2+2+   A2+2+  ToesDownDown  No hopkins present bilaterally     Coordination:   Normal: Rapid alternating movements are performed with normal speed, amplitude and rhythm; finger to nose and heel-knee-shin movements performed accurately and without dysmetria.     Gait:   Normal Gait and Station: Arises independently; normal posture; gait stable with normal stride length, rate,  base and arm swing. Heel, toe tandem gait performed without difficulty.     RESULTS   Data reviewed:  Lab Results   Component Value Date    HGBA1C 4.5 06/01/2021    TSH 1.69 08/06/2024     CT sinus wo 12/27/2024  IMPRESSION:  Near-complete opacification of the left maxillary sinus. Remaining paranasal sinuses are clear.  Variant anatomy as detailed most notably being complete sellar pneumatization of the right sphenoid sinus.    No EEG results found for the past 12 months  No EMG results found for the past 12 months  No MRI head results found for the past 12 months    IMPRESSION AND PLAN   Bertha Antonio is a 34 yo woman with PMHx of anxiety who presents with intermittent right facial paresthesia over the last year. This is at times associated with mild headache and blurry vision but denies bulbar symptoms. On neuro exam, pertinent findings include decreased light touch and pinprick on right cheek anterior to ear; remainder of neuro exam reassuring. Will obtain MRI of brain with close attention to brainstem and serum studies listed below to further evaluate.     RTC in 6-8 weeks    Patient understands and agrees to plan    Diagnoses and all orders for this visit:  Facial numbness  -     Referral to Neurology  -     Follow Up In Neurology; Future  Atypical face pain  -     Referral to Neurology  -     TSH with reflex to Free T4 if abnormal; Future  -     Vitamin B12; Future  -     Methylmalonic Acid; Future  -     Serum Protein Electrophoresis; Future  -     SANNA + YOKASTA Panel; Future  -     MR brain w and wo IV contrast; Future  -     Sedimentation Rate; Future  -     C-Reactive Protein; Future        Patient Instructions   I have ordered blood work and a MRI of your brain.    Follow up in about 6 weeks via telehealth.     Melissa Tafoya,   Neurology  [facility]    Billing and Coding Determination:  Medical decision making was moderate complexity. The patient was seen and evaluated for 1 undiagnosed new  problem with uncertain prognosis. I personally reviewed multiple external notes and multiple test results from multiple sources in the EHR. I independently interpreted the patient's labwork. Mri brain and additional labwork were ordered for further assessment.          [1]  Patient Active Problem List  Diagnosis   • S/P  section   • Health care maintenance   • Breast pain, left   • Other fatigue   • Depression   • Contact with and (suspected) exposure to covid-19   • Chest pain   • Pain in the coccyx   • Chronic sinusitis   • Nasal congestion   • Atypical face pain   • Facial tingling   • Neck pain   • Anxiety   [2]  Past Medical History:  Diagnosis Date   • Abnormal Pap smear of cervix 2019    Formatting of this note might be different from the original. 29 y.o.  Paragard for contraception Former smoker Normal paps before this 2019: ASCUS, HPV other + 2019: colpo adequate, acetowhite changes at 10-12, some hypervascularity. Biopsy 1 o'clock MATTEO I Plan: co-testing in 1 year   • Headache    • HPV in female 2021   [3]  Past Surgical History:  Procedure Laterality Date   •  SECTION, LOW TRANSVERSE     [4]  No Known Allergies       [1]  Patient Active Problem List  Diagnosis   • S/P  section   • Health care maintenance   • Breast pain, left   • Other fatigue   • Depression   • Contact with and (suspected) exposure to covid-19   • Chest pain   • Pain in the coccyx   • Chronic sinusitis   • Nasal congestion   • Atypical face pain   • Facial tingling   • Neck pain   • Anxiety   [2]  Past Medical History:  Diagnosis Date   • Abnormal Pap smear of cervix 2019    Formatting of this note might be different from the original. 29 y.o.  Paragard for contraception Former smoker Normal paps before this 2019: ASCUS, HPV other + 2019: colpo adequate, acetowhite changes at 10-12, some hypervascularity. Biopsy 1 o'clock MATTEO I Plan: co-testing in 1 year   • Headache    •  HPV in female 2021   [3]  Past Surgical History:  Procedure Laterality Date   •  SECTION, LOW TRANSVERSE     [4]  No Known Allergies

## 2025-07-30 ENCOUNTER — APPOINTMENT (OUTPATIENT)
Dept: RADIOLOGY | Facility: HOSPITAL | Age: 35
End: 2025-07-30
Payer: COMMERCIAL

## 2025-07-30 DIAGNOSIS — G50.1 ATYPICAL FACE PAIN: ICD-10-CM

## 2025-07-30 PROCEDURE — 2550000001 HC RX 255 CONTRASTS: Mod: JW | Performed by: STUDENT IN AN ORGANIZED HEALTH CARE EDUCATION/TRAINING PROGRAM

## 2025-07-30 PROCEDURE — A9575 INJ GADOTERATE MEGLUMI 0.1ML: HCPCS | Mod: JW | Performed by: STUDENT IN AN ORGANIZED HEALTH CARE EDUCATION/TRAINING PROGRAM

## 2025-07-30 PROCEDURE — 70553 MRI BRAIN STEM W/O & W/DYE: CPT | Performed by: RADIOLOGY

## 2025-07-30 PROCEDURE — 70553 MRI BRAIN STEM W/O & W/DYE: CPT

## 2025-07-30 RX ORDER — GADOTERATE MEGLUMINE 376.9 MG/ML
17 INJECTION INTRAVENOUS
Status: COMPLETED | OUTPATIENT
Start: 2025-07-30 | End: 2025-07-30

## 2025-07-30 RX ADMIN — GADOTERATE MEGLUMINE 17 ML: 376.9 INJECTION INTRAVENOUS at 19:06

## 2025-08-01 ENCOUNTER — RESULTS FOLLOW-UP (OUTPATIENT)
Dept: NEUROLOGY | Facility: CLINIC | Age: 35
End: 2025-08-01
Payer: COMMERCIAL

## 2025-08-01 DIAGNOSIS — G50.9 TRIGEMINAL NERVE DISORDER: Primary | ICD-10-CM

## 2025-08-08 ENCOUNTER — APPOINTMENT (OUTPATIENT)
Dept: PRIMARY CARE | Facility: CLINIC | Age: 35
End: 2025-08-08
Payer: COMMERCIAL

## 2025-08-08 VITALS
OXYGEN SATURATION: 98 % | TEMPERATURE: 97.4 F | BODY MASS INDEX: 34.84 KG/M2 | DIASTOLIC BLOOD PRESSURE: 86 MMHG | SYSTOLIC BLOOD PRESSURE: 120 MMHG | HEART RATE: 96 BPM | WEIGHT: 184.4 LBS

## 2025-08-08 DIAGNOSIS — Z13.6 SCREENING FOR CARDIOVASCULAR CONDITION: ICD-10-CM

## 2025-08-08 DIAGNOSIS — Z00.00 HEALTH CARE MAINTENANCE: Primary | ICD-10-CM

## 2025-08-08 DIAGNOSIS — Z13.0 SCREENING FOR DEFICIENCY ANEMIA: ICD-10-CM

## 2025-08-08 PROCEDURE — 99395 PREV VISIT EST AGE 18-39: CPT | Performed by: NURSE PRACTITIONER

## 2025-08-08 ASSESSMENT — ENCOUNTER SYMPTOMS
PHOTOPHOBIA: 0
NECK PAIN: 0
MYALGIAS: 0
HEADACHES: 0
SORE THROAT: 0
NUMBNESS: 1
RHINORRHEA: 0
BRUISES/BLEEDS EASILY: 0
EYE ITCHING: 0
DYSPHORIC MOOD: 0
CHEST TIGHTNESS: 0
UNEXPECTED WEIGHT CHANGE: 0
PALPITATIONS: 0
DIZZINESS: 0
SLEEP DISTURBANCE: 0
SINUS PRESSURE: 0
DIARRHEA: 0
ADENOPATHY: 0
EYE PAIN: 0
DYSURIA: 0
VOMITING: 0
CONSTIPATION: 0
WHEEZING: 0
HEMATURIA: 0
COUGH: 0
NERVOUS/ANXIOUS: 0
ABDOMINAL PAIN: 0
BLOOD IN STOOL: 0
POLYPHAGIA: 0
FEVER: 0
CHILLS: 0
EYE DISCHARGE: 0
ARTHRALGIAS: 0
SPEECH DIFFICULTY: 0
FATIGUE: 0
EYE REDNESS: 0
BACK PAIN: 1
NAUSEA: 0
POLYDIPSIA: 0
WEAKNESS: 0
FREQUENCY: 0
SHORTNESS OF BREATH: 0
DIFFICULTY URINATING: 0

## 2025-08-08 ASSESSMENT — PATIENT HEALTH QUESTIONNAIRE - PHQ9
SUM OF ALL RESPONSES TO PHQ9 QUESTIONS 1 AND 2: 0
1. LITTLE INTEREST OR PLEASURE IN DOING THINGS: NOT AT ALL
2. FEELING DOWN, DEPRESSED OR HOPELESS: NOT AT ALL

## 2025-08-08 NOTE — PROGRESS NOTES
Subjective   Bertha Antonio is a 35 y.o. female who presents for Annual Exam (Pt is fasting. GYN: Follows with Whitney Chamorro - CNP LMP: Approx 1.5 week ago./).    HPI  Last well exam: 1 year ago  Health has been good in general.   Saw neurology and has a vascular structure near the trigeminal nerve that is causing her intermittent numbness in face.  She will be seeing neurosurgery.  There have been no changes in the patients PMH, PSH, FH or social history. Reviewed today.  Diet: Overall pretty good. Trying to eat better.  Exercise: No scheduled exercise. Does a lot of family walks.   Dental: Regular dental exams. Brushes 2 times a day. Flosses 3-4 times a day.  Vision: Last eye exam: 4/2025  Corrected with glasses  Tobacco Use: None  Alcohol Use: Very rare  Sexually active: Engaged and sexually active.   LMP: 7/25/2025  Periods are regular and occurring every 28 days  Duration: 3-5 days  No heavy bleeding.  Mild cramping  PAP:UTD on PAP- follows with GYN.  Birth control: None  Immunizations: UTD  Colonoscopy: No family history of colon cancer.  Mammogram: No family history of breast cancer    Last labs:Ordered today.     Review of Systems   Constitutional:  Negative for chills, fatigue, fever and unexpected weight change.   HENT:  Negative for congestion, ear pain, hearing loss, nosebleeds, postnasal drip, rhinorrhea, sinus pressure, sore throat and tinnitus.    Eyes:  Negative for photophobia, pain, discharge, redness, itching and visual disturbance.   Respiratory:  Negative for cough, chest tightness, shortness of breath and wheezing.    Cardiovascular:  Negative for chest pain, palpitations and leg swelling.   Gastrointestinal:  Negative for abdominal pain, blood in stool, constipation, diarrhea, nausea and vomiting.   Endocrine: Negative for cold intolerance, heat intolerance, polydipsia, polyphagia and polyuria.   Genitourinary:  Negative for difficulty urinating, dysuria, frequency, hematuria and urgency.    Musculoskeletal:  Positive for back pain. Negative for arthralgias, myalgias and neck pain.        Right low back pain at times will go down the leg. No numbness/tingling/weakness.    Skin:  Negative for rash.   Neurological:  Positive for numbness. Negative for dizziness, syncope, speech difficulty, weakness and headaches.        Occasional facial numbness.   Hematological:  Negative for adenopathy. Does not bruise/bleed easily.   Psychiatric/Behavioral:  Negative for dysphoric mood and sleep disturbance. The patient is not nervous/anxious.        Objective   /86 (BP Location: Left arm, Patient Position: Sitting)   Pulse 96   Temp 36.3 °C (97.4 °F) (Temporal)   Wt 83.6 kg (184 lb 6.4 oz)   SpO2 98%   BMI 34.84 kg/m²     Physical Exam  Constitutional:       General: She is not in acute distress.     Appearance: Normal appearance. She is not toxic-appearing.   HENT:      Head: Normocephalic and atraumatic.      Right Ear: Tympanic membrane and ear canal normal.      Left Ear: Tympanic membrane and ear canal normal.      Nose: Nose normal.      Mouth/Throat:      Mouth: Mucous membranes are moist.      Pharynx: Oropharynx is clear.     Eyes:      Extraocular Movements: Extraocular movements intact.      Conjunctiva/sclera: Conjunctivae normal.      Pupils: Pupils are equal, round, and reactive to light.     Neck:      Thyroid: No thyroid mass or thyromegaly.     Cardiovascular:      Rate and Rhythm: Normal rate and regular rhythm.      Pulses: Normal pulses.      Heart sounds: Normal heart sounds, S1 normal and S2 normal. No murmur heard.  Pulmonary:      Effort: Pulmonary effort is normal. No respiratory distress.      Breath sounds: Normal breath sounds.   Abdominal:      General: Bowel sounds are normal.      Palpations: Abdomen is soft.      Tenderness: There is no abdominal tenderness.     Musculoskeletal:         General: Normal range of motion.      Cervical back: Normal range of motion and neck  supple.      Right lower leg: No edema.      Left lower leg: No edema.   Lymphadenopathy:      Cervical: No cervical adenopathy.     Skin:     General: Skin is warm and dry.     Neurological:      Mental Status: She is alert and oriented to person, place, and time.      Cranial Nerves: No cranial nerve deficit.      Sensory: No sensory deficit.      Motor: No weakness.      Coordination: Coordination normal.      Gait: Gait normal.      Deep Tendon Reflexes: Reflexes are normal and symmetric. Reflexes normal.     Psychiatric:         Attention and Perception: Attention normal.         Mood and Affect: Mood and affect normal.         Speech: Speech normal.         Behavior: Behavior normal.         Thought Content: Thought content normal.         Judgment: Judgment normal.         Assessment/Plan   Problem List Items Addressed This Visit       Health care maintenance - Primary    Relevant Orders    Comprehensive Metabolic Panel     Other Visit Diagnoses         Screening for deficiency anemia        Relevant Orders    CBC      Screening for cardiovascular condition        Relevant Orders    Lipid Panel          Advised to continue to focus on a healthy diet and exercise.   Check fasting labs.  Follow up in 1 year or sooner if needed.    It has been a pleasure seeing you today!

## 2025-08-08 NOTE — PATIENT INSTRUCTIONS
Continue to focus on a healthy diet and exercise.   Check fasting labs.  Follow up in 1 year or sooner if needed.

## 2025-08-14 ENCOUNTER — LAB (OUTPATIENT)
Dept: LAB | Facility: HOSPITAL | Age: 35
End: 2025-08-14
Payer: COMMERCIAL

## 2025-08-14 LAB
ALBUMIN SERPL-MCNC: 4.3 G/DL (ref 3.6–5.1)
ALP SERPL-CCNC: 63 U/L (ref 31–125)
ALT SERPL-CCNC: 17 U/L (ref 6–29)
ANION GAP SERPL CALCULATED.4IONS-SCNC: 9 MMOL/L (CALC) (ref 7–17)
AST SERPL-CCNC: 13 U/L (ref 10–30)
BILIRUB SERPL-MCNC: 1.1 MG/DL (ref 0.2–1.2)
BUN SERPL-MCNC: 10 MG/DL (ref 7–25)
CALCIUM SERPL-MCNC: 9.3 MG/DL (ref 8.6–10.2)
CHLORIDE SERPL-SCNC: 107 MMOL/L (ref 98–110)
CHOLEST SERPL-MCNC: 176 MG/DL
CHOLEST/HDLC SERPL: 3.6 (CALC)
CO2 SERPL-SCNC: 23 MMOL/L (ref 20–32)
CREAT SERPL-MCNC: 0.74 MG/DL (ref 0.5–0.97)
EGFRCR SERPLBLD CKD-EPI 2021: 108 ML/MIN/1.73M2
ERYTHROCYTE [DISTWIDTH] IN BLOOD BY AUTOMATED COUNT: 11.9 % (ref 11–15)
GLUCOSE SERPL-MCNC: 89 MG/DL (ref 65–99)
HCT VFR BLD AUTO: 42.2 % (ref 35–45)
HDLC SERPL-MCNC: 49 MG/DL
HGB BLD-MCNC: 14.1 G/DL (ref 11.7–15.5)
LDLC SERPL CALC-MCNC: 104 MG/DL (CALC)
MCH RBC QN AUTO: 30.5 PG (ref 27–33)
MCHC RBC AUTO-ENTMCNC: 33.4 G/DL (ref 32–36)
MCV RBC AUTO: 91.3 FL (ref 80–100)
NONHDLC SERPL-MCNC: 127 MG/DL (CALC)
PLATELET # BLD AUTO: 280 THOUSAND/UL (ref 140–400)
PMV BLD REES-ECKER: 10.1 FL (ref 7.5–12.5)
POTASSIUM SERPL-SCNC: 4.1 MMOL/L (ref 3.5–5.3)
PROT SERPL-MCNC: 7 G/DL (ref 6.1–8.1)
RBC # BLD AUTO: 4.62 MILLION/UL (ref 3.8–5.1)
SODIUM SERPL-SCNC: 139 MMOL/L (ref 135–146)
TRIGL SERPL-MCNC: 132 MG/DL
WBC # BLD AUTO: 6.5 THOUSAND/UL (ref 3.8–10.8)

## 2025-08-14 PROCEDURE — 84155 ASSAY OF PROTEIN SERUM: CPT

## 2025-08-14 PROCEDURE — 84165 PROTEIN E-PHORESIS SERUM: CPT

## 2025-08-15 LAB
ANA PAT SER IF-IMP: ABNORMAL
ANA SER QL IF: POSITIVE
ANA TITR SER IF: ABNORMAL TITER
CENTROMERE B AB SER-ACNC: ABNORMAL AI
CRP SERPL-MCNC: <3 MG/L
DSDNA AB SER-ACNC: 1 IU/ML
ENA JO1 AB SER IA-ACNC: ABNORMAL AI
ENA RNP AB SER-ACNC: ABNORMAL AI
ENA SCL70 AB SER IA-ACNC: ABNORMAL AI
ENA SM AB SER IA-ACNC: ABNORMAL AI
ENA SM+RNP AB SER IA-ACNC: ABNORMAL AI
ENA SS-A AB SER IA-ACNC: ABNORMAL AI
ENA SS-B AB SER IA-ACNC: ABNORMAL AI
ERYTHROCYTE [SEDIMENTATION RATE] IN BLOOD BY WESTERGREN METHOD: 2 MM/H
METHYLMALONATE SERPL-SCNC: NORMAL
NUCLEOSOME AB SER IA-ACNC: ABNORMAL AI
PROT SERPL-MCNC: 7 G/DL (ref 6.4–8.2)
RIBOSOMAL P AB SER-ACNC: ABNORMAL AI
TSH SERPL-ACNC: 1.79 MIU/L
VIT B12 SERPL-MCNC: 335 PG/ML (ref 200–1100)

## 2025-08-19 ENCOUNTER — APPOINTMENT (OUTPATIENT)
Dept: NEUROLOGY | Facility: CLINIC | Age: 35
End: 2025-08-19
Payer: COMMERCIAL

## 2025-08-19 DIAGNOSIS — R20.0 FACIAL NUMBNESS: ICD-10-CM

## 2025-08-19 DIAGNOSIS — E53.8 B12 DEFICIENCY: ICD-10-CM

## 2025-08-19 DIAGNOSIS — G50.9 TRIGEMINAL NERVE DISORDER: Primary | ICD-10-CM

## 2025-08-19 LAB
ALBUMIN: 4.2 G/DL (ref 3.4–5)
ALPHA 1 GLOBULIN: 0.3 G/DL (ref 0.2–0.6)
ALPHA 2 GLOBULIN: 0.6 G/DL (ref 0.4–1.1)
BETA GLOBULIN: 0.8 G/DL (ref 0.5–1.2)
GAMMA GLOBULIN: 1.1 G/DL (ref 0.5–1.4)
PATH REVIEW-SERUM PROTEIN ELECTROPHORESIS: NORMAL
PROTEIN ELECTROPHORESIS COMMENT: NORMAL

## 2025-08-19 PROCEDURE — 99213 OFFICE O/P EST LOW 20 MIN: CPT | Performed by: STUDENT IN AN ORGANIZED HEALTH CARE EDUCATION/TRAINING PROGRAM

## 2025-08-20 LAB
ANA PAT SER IF-IMP: ABNORMAL
ANA SER QL IF: POSITIVE
ANA TITR SER IF: ABNORMAL TITER
CENTROMERE B AB SER-ACNC: ABNORMAL AI
CRP SERPL-MCNC: <3 MG/L
DSDNA AB SER-ACNC: 1 IU/ML
ENA JO1 AB SER IA-ACNC: ABNORMAL AI
ENA RNP AB SER-ACNC: ABNORMAL AI
ENA SCL70 AB SER IA-ACNC: ABNORMAL AI
ENA SM AB SER IA-ACNC: ABNORMAL AI
ENA SM+RNP AB SER IA-ACNC: ABNORMAL AI
ENA SS-A AB SER IA-ACNC: ABNORMAL AI
ENA SS-B AB SER IA-ACNC: ABNORMAL AI
ERYTHROCYTE [SEDIMENTATION RATE] IN BLOOD BY WESTERGREN METHOD: 2 MM/H
METHYLMALONATE SERPL-SCNC: 127 NMOL/L (ref 55–335)
NUCLEOSOME AB SER IA-ACNC: ABNORMAL AI
RIBOSOMAL P AB SER-ACNC: ABNORMAL AI
TSH SERPL-ACNC: 1.79 MIU/L
VIT B12 SERPL-MCNC: 335 PG/ML (ref 200–1100)

## 2025-09-19 ENCOUNTER — APPOINTMENT (OUTPATIENT)
Dept: NEUROSURGERY | Facility: CLINIC | Age: 35
End: 2025-09-19
Payer: COMMERCIAL

## 2025-09-26 ENCOUNTER — APPOINTMENT (OUTPATIENT)
Dept: NEUROSURGERY | Facility: CLINIC | Age: 35
End: 2025-09-26
Payer: COMMERCIAL

## 2026-08-10 ENCOUNTER — APPOINTMENT (OUTPATIENT)
Dept: PRIMARY CARE | Facility: CLINIC | Age: 36
End: 2026-08-10
Payer: COMMERCIAL